# Patient Record
Sex: FEMALE | Race: WHITE | NOT HISPANIC OR LATINO | Employment: OTHER | URBAN - NONMETROPOLITAN AREA
[De-identification: names, ages, dates, MRNs, and addresses within clinical notes are randomized per-mention and may not be internally consistent; named-entity substitution may affect disease eponyms.]

---

## 2023-04-23 PROCEDURE — G0180 MD CERTIFICATION HHA PATIENT: HCPCS | Performed by: INTERNAL MEDICINE

## 2023-05-17 PROBLEM — I26.99 PULMONARY EMBOLISM (MULTI): Status: ACTIVE | Noted: 2023-05-17

## 2023-05-17 PROBLEM — E83.52 HYPERCALCEMIA: Status: ACTIVE | Noted: 2023-05-17

## 2023-05-17 PROBLEM — E66.9 OBESITY (BMI 30-39.9): Status: ACTIVE | Noted: 2023-05-17

## 2023-05-17 PROBLEM — I50.9 CHF (CONGESTIVE HEART FAILURE) (MULTI): Status: ACTIVE | Noted: 2023-05-17

## 2023-05-17 PROBLEM — E03.9 HYPOTHYROIDISM: Status: ACTIVE | Noted: 2023-05-17

## 2023-05-17 PROBLEM — E78.00 HYPERCHOLESTEROLEMIA: Status: ACTIVE | Noted: 2023-05-17

## 2023-05-17 PROBLEM — I10 HYPERTENSION: Status: ACTIVE | Noted: 2023-05-17

## 2023-05-17 PROBLEM — M81.0 OSTEOPOROSIS: Status: ACTIVE | Noted: 2023-05-17

## 2023-05-17 PROBLEM — M19.90 OSTEOARTHRITIS: Status: ACTIVE | Noted: 2023-05-17

## 2023-05-17 PROBLEM — G31.84 MCI (MILD COGNITIVE IMPAIRMENT): Status: ACTIVE | Noted: 2023-05-17

## 2023-05-17 PROBLEM — I87.2 VENOUS INSUFFICIENCY (CHRONIC) (PERIPHERAL): Status: ACTIVE | Noted: 2023-05-17

## 2023-05-17 PROBLEM — E78.5 DYSLIPIDEMIA: Status: ACTIVE | Noted: 2023-05-17

## 2023-05-17 PROBLEM — I48.91 ATRIAL FIBRILLATION (MULTI): Status: ACTIVE | Noted: 2023-05-17

## 2023-05-17 RX ORDER — LEVOTHYROXINE SODIUM 75 UG/1
1 TABLET ORAL DAILY
COMMUNITY
Start: 2012-11-07 | End: 2023-05-24 | Stop reason: SDUPTHER

## 2023-05-17 RX ORDER — DILTIAZEM HYDROCHLORIDE 180 MG/1
180 CAPSULE, COATED, EXTENDED RELEASE ORAL DAILY
COMMUNITY
Start: 2023-03-29 | End: 2023-07-03 | Stop reason: SDUPTHER

## 2023-05-17 RX ORDER — TRAMADOL HYDROCHLORIDE 50 MG/1
1 TABLET ORAL EVERY 6 HOURS PRN
COMMUNITY
Start: 2022-10-21 | End: 2023-11-15 | Stop reason: WASHOUT

## 2023-05-17 RX ORDER — WARFARIN SODIUM 5 MG/1
5 TABLET ORAL
COMMUNITY
End: 2023-11-07

## 2023-05-17 RX ORDER — OXYCODONE HYDROCHLORIDE 5 MG/1
5 TABLET ORAL EVERY 6 HOURS PRN
COMMUNITY
Start: 2023-04-21 | End: 2023-11-15 | Stop reason: WASHOUT

## 2023-05-17 RX ORDER — FUROSEMIDE 40 MG/1
1 TABLET ORAL DAILY
COMMUNITY
Start: 2018-11-19 | End: 2023-10-12 | Stop reason: SDUPTHER

## 2023-05-17 RX ORDER — ENOXAPARIN SODIUM 100 MG/ML
60 INJECTION SUBCUTANEOUS 2 TIMES DAILY
COMMUNITY
Start: 2023-03-30 | End: 2023-11-15 | Stop reason: WASHOUT

## 2023-05-17 RX ORDER — GABAPENTIN 100 MG/1
100 CAPSULE ORAL 3 TIMES DAILY
COMMUNITY
End: 2023-11-15 | Stop reason: WASHOUT

## 2023-05-17 RX ORDER — EPINEPHRINE 0.3 MG/.3ML
1 INJECTION SUBCUTANEOUS ONCE
COMMUNITY
Start: 2017-09-21

## 2023-05-17 RX ORDER — WARFARIN 1 MG/1
TABLET ORAL
COMMUNITY
Start: 2021-07-27 | End: 2024-05-29 | Stop reason: ALTCHOICE

## 2023-05-17 RX ORDER — METHOCARBAMOL 750 MG/1
750 TABLET, FILM COATED ORAL NIGHTLY
COMMUNITY
End: 2023-11-15 | Stop reason: WASHOUT

## 2023-05-24 ENCOUNTER — OFFICE VISIT (OUTPATIENT)
Dept: PRIMARY CARE | Facility: CLINIC | Age: 83
End: 2023-05-24
Payer: MEDICARE

## 2023-05-24 VITALS
SYSTOLIC BLOOD PRESSURE: 100 MMHG | BODY MASS INDEX: 29.43 KG/M2 | DIASTOLIC BLOOD PRESSURE: 56 MMHG | HEART RATE: 80 BPM | OXYGEN SATURATION: 96 % | WEIGHT: 172.4 LBS | TEMPERATURE: 96.1 F | HEIGHT: 64 IN

## 2023-05-24 DIAGNOSIS — Z12.31 ENCOUNTER FOR SCREENING MAMMOGRAM FOR BREAST CANCER: ICD-10-CM

## 2023-05-24 DIAGNOSIS — I10 HYPERTENSION, UNSPECIFIED TYPE: ICD-10-CM

## 2023-05-24 DIAGNOSIS — Z13.89 SCREENING FOR MULTIPLE CONDITIONS: ICD-10-CM

## 2023-05-24 DIAGNOSIS — E78.5 DYSLIPIDEMIA: ICD-10-CM

## 2023-05-24 DIAGNOSIS — I48.11 LONGSTANDING PERSISTENT ATRIAL FIBRILLATION (MULTI): ICD-10-CM

## 2023-05-24 DIAGNOSIS — Z96.642 HISTORY OF TOTAL LEFT HIP REPLACEMENT: ICD-10-CM

## 2023-05-24 DIAGNOSIS — R53.83 OTHER FATIGUE: ICD-10-CM

## 2023-05-24 DIAGNOSIS — E04.2 MULTINODULAR GOITER: ICD-10-CM

## 2023-05-24 DIAGNOSIS — I50.9 CONGESTIVE HEART FAILURE, UNSPECIFIED HF CHRONICITY, UNSPECIFIED HEART FAILURE TYPE (MULTI): ICD-10-CM

## 2023-05-24 DIAGNOSIS — E03.9 HYPOTHYROIDISM, UNSPECIFIED TYPE: Primary | ICD-10-CM

## 2023-05-24 DIAGNOSIS — Z79.899 HIGH RISK MEDICATION USE: ICD-10-CM

## 2023-05-24 DIAGNOSIS — E78.00 HYPERCHOLESTEREMIA: ICD-10-CM

## 2023-05-24 DIAGNOSIS — M50.90 CERVICAL DISC DISEASE: ICD-10-CM

## 2023-05-24 DIAGNOSIS — Z00.00 ROUTINE GENERAL MEDICAL EXAMINATION AT HEALTH CARE FACILITY: ICD-10-CM

## 2023-05-24 DIAGNOSIS — Z13.31 DEPRESSION SCREENING: ICD-10-CM

## 2023-05-24 PROCEDURE — G0439 PPPS, SUBSEQ VISIT: HCPCS | Performed by: INTERNAL MEDICINE

## 2023-05-24 PROCEDURE — 99214 OFFICE O/P EST MOD 30 MIN: CPT | Performed by: INTERNAL MEDICINE

## 2023-05-24 PROCEDURE — 1160F RVW MEDS BY RX/DR IN RCRD: CPT | Performed by: INTERNAL MEDICINE

## 2023-05-24 PROCEDURE — 1036F TOBACCO NON-USER: CPT | Performed by: INTERNAL MEDICINE

## 2023-05-24 PROCEDURE — 1159F MED LIST DOCD IN RCRD: CPT | Performed by: INTERNAL MEDICINE

## 2023-05-24 PROCEDURE — 1170F FXNL STATUS ASSESSED: CPT | Performed by: INTERNAL MEDICINE

## 2023-05-24 PROCEDURE — 3074F SYST BP LT 130 MM HG: CPT | Performed by: INTERNAL MEDICINE

## 2023-05-24 PROCEDURE — 3078F DIAST BP <80 MM HG: CPT | Performed by: INTERNAL MEDICINE

## 2023-05-24 PROCEDURE — G0444 DEPRESSION SCREEN ANNUAL: HCPCS | Performed by: INTERNAL MEDICINE

## 2023-05-24 RX ORDER — LEVOTHYROXINE SODIUM 75 UG/1
75 TABLET ORAL DAILY
Qty: 90 TABLET | Refills: 1 | Status: SHIPPED | OUTPATIENT
Start: 2023-05-24 | End: 2023-10-27 | Stop reason: SDUPTHER

## 2023-05-24 ASSESSMENT — ENCOUNTER SYMPTOMS
SORE THROAT: 0
SINUS PAIN: 0
OCCASIONAL FEELINGS OF UNSTEADINESS: 0
COUGH: 0
DEPRESSION: 0
HEADACHES: 0
ARTHRALGIAS: 0
HYPERTENSION: 1
PALPITATIONS: 0
DIZZINESS: 0
DIFFICULTY URINATING: 0
FEVER: 0
ABDOMINAL PAIN: 0
FATIGUE: 0
UNEXPECTED WEIGHT CHANGE: 0
BRUISES/BLEEDS EASILY: 0
BLOOD IN STOOL: 0
WHEEZING: 0
LOSS OF SENSATION IN FEET: 1
DIARRHEA: 0

## 2023-05-24 ASSESSMENT — PATIENT HEALTH QUESTIONNAIRE - PHQ9
2. FEELING DOWN, DEPRESSED OR HOPELESS: NOT AT ALL
SUM OF ALL RESPONSES TO PHQ9 QUESTIONS 1 AND 2: 0
1. LITTLE INTEREST OR PLEASURE IN DOING THINGS: NOT AT ALL

## 2023-05-24 ASSESSMENT — ACTIVITIES OF DAILY LIVING (ADL)
DRESSING: INDEPENDENT
MANAGING_FINANCES: NEEDS ASSISTANCE
GROCERY_SHOPPING: NEEDS ASSISTANCE
BATHING: INDEPENDENT
TAKING_MEDICATION: NEEDS ASSISTANCE
DOING_HOUSEWORK: NEEDS ASSISTANCE

## 2023-05-24 NOTE — PROGRESS NOTES
Subjective   Reason for Visit: Nadeen Moore is an 83 y.o. female here for a Medicare Wellness visit.     Past Medical, Surgical, and Family History reviewed and updated in chart.    Reviewed all medications by prescribing practitioner or clinical pharmacist (such as prescriptions, OTCs, herbal therapies and supplements) and documented in the medical record.    Annual preventive visit Medicare physical  -Vaccinations up-to-date  - Needs a screening mammogram order placed today  - Needs to complete blood work  - Screening for depression  I spent 15 minutes obtaining and discussing depression screening using PHQ-2 questions with results documented in the chart.  -    Follow-up  - Patient underwent uneventful surgery for left hip arthroplasty doing much better continue with physical therapy  -Cervical disc disease awaiting follow-up neurosurgery for possible surgical intervention due to bilateral upper extremity numbness continue monitoring mostly  -Multinodular goiter need to follow-up thyroid function test need to obtain thyroid ultrasound follow-up Dr. Hanna as needed  - Patient seen in the [facility] for memory evaluation patient preliminary testing mild dementia counseled about conservative measures for dementia no further needs at this time  -Chronic atrial fibrillation rate controlled  Coumadin therapy INR therapeutic follow-up Coumadin clinic  -Underlying history of DVT and PE atrial fibrillation continue warfarin  -pulmonary embolism compensated no recurrence  -CHF compensated improving patient now taking Lasix 20 milligrams daily blood pressure controlled  - status post right shoulder arthroplasty doing very well  -Atrial fibrillation rate controlled continue on ditiazem, rate controlled  Follow-up 6 months       Congestive Heart Failure  Pertinent negatives include no abdominal pain, chest pain, fatigue, palpitations or unexpected weight change.   Atrial Fibrillation  Symptoms  "are negative for chest pain, dizziness and palpitations. Past medical history includes atrial fibrillation, CHF and hyperlipidemia.   Hyperlipidemia  Pertinent negatives include no chest pain.   Hypertension  Pertinent negatives include no chest pain, headaches or palpitations.       Patient Care Team:  Tiny Norris MD as PCP - General  Tiny Norris MD as PCP - [facility] Attributed Provider     Review of Systems   Constitutional:  Negative for fatigue, fever and unexpected weight change.   HENT:  Negative for congestion, ear discharge, ear pain, mouth sores, sinus pain and sore throat.    Eyes:  Negative for visual disturbance.   Respiratory:  Negative for cough and wheezing.    Cardiovascular:  Negative for chest pain, palpitations and leg swelling.   Gastrointestinal:  Negative for abdominal pain, blood in stool and diarrhea.   Genitourinary:  Negative for difficulty urinating.   Musculoskeletal:  Negative for arthralgias.   Skin:  Negative for rash.   Neurological:  Negative for dizziness and headaches.   Hematological:  Does not bruise/bleed easily.   Psychiatric/Behavioral:  Negative for behavioral problems.    All other systems reviewed and are negative.      Objective   Vitals:  /56   Pulse 80   Temp 35.6 °C (96.1 °F)   Ht 1.613 m (5' 3.5\")   Wt 78.2 kg (172 lb 6.4 oz)   SpO2 96%   BMI 30.06 kg/m²       Physical Exam  Vitals and nursing note reviewed.   Constitutional:       Appearance: Normal appearance.   HENT:      Head: Normocephalic.      Nose: Nose normal.   Eyes:      Conjunctiva/sclera: Conjunctivae normal.      Pupils: Pupils are equal, round, and reactive to light.   Cardiovascular:      Rate and Rhythm: Regular rhythm.   Pulmonary:      Effort: Pulmonary effort is normal.      Breath sounds: Normal breath sounds.   Abdominal:      General: Abdomen is flat.      Palpations: Abdomen is soft.   Musculoskeletal:      Cervical back: Neck supple.   Skin:     General: Skin is warm. "   Neurological:      General: No focal deficit present.      Mental Status: She is oriented to person, place, and time.   Psychiatric:         Mood and Affect: Mood normal.         Assessment/Plan   Problem List Items Addressed This Visit          Circulatory    Atrial fibrillation (CMS/HCC)    Relevant Medications    dilTIAZem CD (Cardizem CD) 180 mg 24 hr capsule    EPINEPHrine 0.3 mg/0.3 mL injection syringe    CHF (congestive heart failure) (CMS/HCC)    Relevant Medications    dilTIAZem CD (Cardizem CD) 180 mg 24 hr capsule    EPINEPHrine 0.3 mg/0.3 mL injection syringe       Endocrine/Metabolic    Hypothyroidism       Other    Dyslipidemia     Annual preventive visit Medicare physical  -Vaccinations up-to-date  - Needs a screening mammogram order placed today  - Needs to complete blood work  - Screening for depression  I spent 15 minutes obtaining and discussing depression screening using PHQ-2 questions with results documented in the chart.  -    Follow-up  - Patient underwent uneventful surgery for left hip arthroplasty doing much better continue with physical therapy  -Cervical disc disease awaiting follow-up neurosurgery for possible surgical intervention due to bilateral upper extremity numbness continue monitoring mostly  -Multinodular goiter need to follow-up thyroid function test need to obtain thyroid ultrasound follow-up Dr. Hanna as needed  - Patient seen in the [facility] for memory evaluation patient preliminary testing mild dementia counseled about conservative measures for dementia no further needs at this time  -Chronic atrial fibrillation rate controlled  Coumadin therapy INR therapeutic follow-up Coumadin clinic  -Underlying history of DVT and PE atrial fibrillation continue warfarin  -pulmonary embolism compensated no recurrence  -CHF compensated improving patient now taking Lasix 20 milligrams daily blood pressure controlled  - status post right shoulder arthroplasty  doing very well  -Atrial fibrillation rate controlled continue on ditiazem, rate controlled  Follow-up 6 months

## 2023-05-25 ENCOUNTER — LAB (OUTPATIENT)
Dept: LAB | Facility: LAB | Age: 83
End: 2023-05-25
Payer: MEDICARE

## 2023-05-25 DIAGNOSIS — Z79.899 HIGH RISK MEDICATION USE: ICD-10-CM

## 2023-05-25 DIAGNOSIS — R53.83 OTHER FATIGUE: ICD-10-CM

## 2023-05-25 DIAGNOSIS — I10 HYPERTENSION, UNSPECIFIED TYPE: ICD-10-CM

## 2023-05-25 DIAGNOSIS — E78.00 HYPERCHOLESTEREMIA: ICD-10-CM

## 2023-05-25 LAB
ALANINE AMINOTRANSFERASE (SGPT) (U/L) IN SER/PLAS: 10 U/L (ref 7–45)
ALBUMIN (G/DL) IN SER/PLAS: 4.4 G/DL (ref 3.4–5)
ALKALINE PHOSPHATASE (U/L) IN SER/PLAS: 61 U/L (ref 33–136)
ANION GAP IN SER/PLAS: 12 MMOL/L (ref 10–20)
ASPARTATE AMINOTRANSFERASE (SGOT) (U/L) IN SER/PLAS: 13 U/L (ref 9–39)
BASOPHILS (10*3/UL) IN BLOOD BY AUTOMATED COUNT: 0.04 X10E9/L (ref 0–0.1)
BASOPHILS/100 LEUKOCYTES IN BLOOD BY AUTOMATED COUNT: 0.6 % (ref 0–2)
BILIRUBIN TOTAL (MG/DL) IN SER/PLAS: 0.5 MG/DL (ref 0–1.2)
CALCIUM (MG/DL) IN SER/PLAS: 10.3 MG/DL (ref 8.6–10.3)
CARBON DIOXIDE, TOTAL (MMOL/L) IN SER/PLAS: 28 MMOL/L (ref 21–32)
CHLORIDE (MMOL/L) IN SER/PLAS: 107 MMOL/L (ref 98–107)
CHOLESTEROL (MG/DL) IN SER/PLAS: 130 MG/DL (ref 0–199)
CHOLESTEROL IN HDL (MG/DL) IN SER/PLAS: 45.3 MG/DL
CHOLESTEROL/HDL RATIO: 2.9
CREATININE (MG/DL) IN SER/PLAS: 0.93 MG/DL (ref 0.5–1.05)
EOSINOPHILS (10*3/UL) IN BLOOD BY AUTOMATED COUNT: 0.29 X10E9/L (ref 0–0.4)
EOSINOPHILS/100 LEUKOCYTES IN BLOOD BY AUTOMATED COUNT: 4.4 % (ref 0–6)
ERYTHROCYTE DISTRIBUTION WIDTH (RATIO) BY AUTOMATED COUNT: 13.4 % (ref 11.5–14.5)
ERYTHROCYTE MEAN CORPUSCULAR HEMOGLOBIN CONCENTRATION (G/DL) BY AUTOMATED: 31.6 G/DL (ref 32–36)
ERYTHROCYTE MEAN CORPUSCULAR VOLUME (FL) BY AUTOMATED COUNT: 102 FL (ref 80–100)
ERYTHROCYTES (10*6/UL) IN BLOOD BY AUTOMATED COUNT: 4.2 X10E12/L (ref 4–5.2)
GFR FEMALE: 61 ML/MIN/1.73M2
GLUCOSE (MG/DL) IN SER/PLAS: 88 MG/DL (ref 74–99)
HEMATOCRIT (%) IN BLOOD BY AUTOMATED COUNT: 43 % (ref 36–46)
HEMOGLOBIN (G/DL) IN BLOOD: 13.6 G/DL (ref 12–16)
IMMATURE GRANULOCYTES/100 LEUKOCYTES IN BLOOD BY AUTOMATED COUNT: 0.3 % (ref 0–0.9)
LDL: 70 MG/DL (ref 0–99)
LEUKOCYTES (10*3/UL) IN BLOOD BY AUTOMATED COUNT: 6.6 X10E9/L (ref 4.4–11.3)
LYMPHOCYTES (10*3/UL) IN BLOOD BY AUTOMATED COUNT: 1.69 X10E9/L (ref 0.8–3)
LYMPHOCYTES/100 LEUKOCYTES IN BLOOD BY AUTOMATED COUNT: 25.7 % (ref 13–44)
MONOCYTES (10*3/UL) IN BLOOD BY AUTOMATED COUNT: 0.44 X10E9/L (ref 0.05–0.8)
MONOCYTES/100 LEUKOCYTES IN BLOOD BY AUTOMATED COUNT: 6.7 % (ref 2–10)
NEUTROPHILS (10*3/UL) IN BLOOD BY AUTOMATED COUNT: 4.09 X10E9/L (ref 1.6–5.5)
NEUTROPHILS/100 LEUKOCYTES IN BLOOD BY AUTOMATED COUNT: 62.3 % (ref 40–80)
PLATELETS (10*3/UL) IN BLOOD AUTOMATED COUNT: 180 X10E9/L (ref 150–450)
POTASSIUM (MMOL/L) IN SER/PLAS: 3.9 MMOL/L (ref 3.5–5.3)
PROTEIN TOTAL: 7 G/DL (ref 6.4–8.2)
SODIUM (MMOL/L) IN SER/PLAS: 143 MMOL/L (ref 136–145)
THYROTROPIN (MIU/L) IN SER/PLAS BY DETECTION LIMIT <= 0.05 MIU/L: 1.41 MIU/L (ref 0.44–3.98)
TRIGLYCERIDE (MG/DL) IN SER/PLAS: 76 MG/DL (ref 0–149)
UREA NITROGEN (MG/DL) IN SER/PLAS: 22 MG/DL (ref 6–23)
VLDL: 15 MG/DL (ref 0–40)

## 2023-05-25 PROCEDURE — 80061 LIPID PANEL: CPT

## 2023-05-25 PROCEDURE — 84443 ASSAY THYROID STIM HORMONE: CPT

## 2023-05-25 PROCEDURE — 36415 COLL VENOUS BLD VENIPUNCTURE: CPT

## 2023-05-25 PROCEDURE — 85025 COMPLETE CBC W/AUTO DIFF WBC: CPT

## 2023-05-25 PROCEDURE — 80053 COMPREHEN METABOLIC PANEL: CPT

## 2023-06-30 DIAGNOSIS — I10 HYPERTENSION, UNSPECIFIED TYPE: ICD-10-CM

## 2023-07-03 RX ORDER — DILTIAZEM HYDROCHLORIDE 180 MG/1
CAPSULE, COATED, EXTENDED RELEASE ORAL
Qty: 90 CAPSULE | Refills: 1 | Status: SHIPPED | OUTPATIENT
Start: 2023-07-03 | End: 2023-12-27 | Stop reason: SDUPTHER

## 2023-09-03 DIAGNOSIS — E78.5 DYSLIPIDEMIA: ICD-10-CM

## 2023-09-04 RX ORDER — ATORVASTATIN CALCIUM 20 MG/1
TABLET, FILM COATED ORAL
Qty: 90 TABLET | Refills: 1 | Status: SHIPPED | OUTPATIENT
Start: 2023-09-04 | End: 2024-01-18 | Stop reason: SDUPTHER

## 2023-10-03 ENCOUNTER — TELEPHONE (OUTPATIENT)
Dept: PHARMACY | Facility: HOSPITAL | Age: 83
End: 2023-10-03
Payer: MEDICARE

## 2023-10-03 NOTE — TELEPHONE ENCOUNTER
NICOLÁS ROSAS is a 82 year old female enrolled in the [facility] for warfarin management.     Patient is on warfarin for: atrial fibrillation   Patient's INR goal is: 2.0 - 3.0  Previous INR: 2.8  Patient's INR today is: 1.2    Warfarin tablet strength: 5 mg   Current warfarin regimen: 1 tablet daily   Weekly dose: 35 mg/week    Assessment and Plan: INR result received from [employer]. Reported INR is subtherapeutic at 1.2. Patient reports no changes in diet or medications. Usually eats 2-3 salads per week which has been consistent. Patient also recently underwent a hip replacement and states that she uses tylenol as needed for pain which is infrequent. Full adherence to warfarin regimen reported including no missed or extra doses. No signs or symptoms of bleeding or thrombosis reported. No identified cause of subtherapeutic level. Advised patient to take 10 mg x1 dose today, then resume warfarin 5 mg every day. Patient reports no refilled needed. Patient is agreeable to checking INR again in one week.

## 2023-10-10 ENCOUNTER — ANTICOAGULATION - WARFARIN VISIT (OUTPATIENT)
Dept: PHARMACY | Facility: HOSPITAL | Age: 83
End: 2023-10-10
Payer: MEDICARE

## 2023-10-10 DIAGNOSIS — I48.91 ATRIAL FIBRILLATION, UNSPECIFIED TYPE (MULTI): Primary | ICD-10-CM

## 2023-10-10 LAB
POC INR: 1.6 (ref 2–3)
POC PROTHROMBIN TIME: ABNORMAL

## 2023-10-10 PROCEDURE — 85610 PROTHROMBIN TIME: CPT | Performed by: PHARMACY

## 2023-10-10 NOTE — PROGRESS NOTES
Subjective     Nadeen Moore is a 83 y.o. female who presents for anticoagulation follow up for atrial fibrillation.    Location: [facility]     Referring Physician: Dr. Tiny Norris  INR Goal: 2.0-3.0  Anticoagulation Medication: warfarin  Indication: atrial fibrillation    Bleeding signs/symptoms: No    Bruising: No   Major bleeding event: No  Thrombosis signs/symptoms: No  Thromboembolic event: No  Missed doses: No  Extra doses: No  Medication changes: No  Dietary changes: Yes  less salads; only a couple times per week  Change in health: No  Change in activity: No  Alcohol: No  Other concerns: No    Upcoming Surgeries:  Does the Patient Have any upcoming surgeries that require interruption in anticoagulation therapy? no  Does the patient require bridging? no    Current Outpatient Medications on File Prior to Visit   Medication Sig Dispense Refill    atorvastatin (Lipitor) 20 mg tablet TAKE 1 TABLET DAILY 90 tablet 1    dilTIAZem CD (Cardizem CD) 180 mg 24 hr capsule TAKE 1 CAPSULE DAILY 90 capsule 1    enoxaparin (Lovenox) 60 mg/0.6 mL syringe Inject 0.6 mL (60 mg) under the skin 2 times a day.      EPINEPHrine 0.3 mg/0.3 mL injection syringe Inject 0.3 mL (0.3 mg) as directed 1 time.      FIBER, CALCIUM POLYCARBOPHIL, ORAL Take by mouth.      furosemide (Lasix) 40 mg tablet Take 1 tablet (40 mg) by mouth once daily.      gabapentin (Neurontin) 100 mg capsule Take 1 capsule (100 mg) by mouth 3 times a day.      levothyroxine (Synthroid, Levoxyl) 75 mcg tablet Take 1 tablet (75 mcg) by mouth once daily. 90 tablet 1    methocarbamol (Robaxin) 750 mg tablet Take 1 tablet (750 mg) by mouth once daily at bedtime.      oxyCODONE (Roxicodone) 5 mg immediate release tablet Take 1 tablet (5 mg) by mouth every 6 hours if needed.      traMADol (Ultram) 50 mg tablet Take 1 tablet (50 mg) by mouth every 6 hours if needed for pain.      warfarin (Coumadin) 1 mg tablet Take by mouth.       warfarin (Coumadin) 5 mg tablet Take 1 tablet (5 mg) by mouth. as directed       No current facility-administered medications on file prior to visit.        Objective   INR Today: 1.6  Previous INR: 1.2    Anticoagulation Summary  As of 10/10/2023      INR goal:  2.0-3.0   TTR:  --   INR used for dosin.60 (10/10/2023)   Weekly warfarin total:  40 mg             Lab Results   Component Value Date    INR 1.60 (A) 10/10/2023    INR 1.1 2021    INR 0.9 2020    INR 1.9 (H) 2020    PROTIME 12.6 2021    PROTIME 10.3 2020    PROTIME 21.4 (H) 2020       Assessment/Plan   Current INR is Subtherapeutic. Previous INR one week ago was also subtherapeutic at 1.2. No specific cause for subtherapeutic levels identified. Patient typically tests at home but ran out of test strips and wanted to come in to the office for POCT. Ordered more test strips and lancets in office with daughter, Carol. Patient reports that her memory is going. She uses a pillbox for all medications. AVS provided with dosing instructions.     Plan:  Increase weekly warfarin dose to 5 mg every day except 7.5 mg on  and .   Approximately 15% increase in weekly dose.  Follow Up: 1 week (resume home INR testing)    Danielle Parker, MendezD, BCPS

## 2023-10-12 DIAGNOSIS — I50.9 CONGESTIVE HEART FAILURE, UNSPECIFIED HF CHRONICITY, UNSPECIFIED HEART FAILURE TYPE (MULTI): ICD-10-CM

## 2023-10-12 RX ORDER — FUROSEMIDE 40 MG/1
40 TABLET ORAL DAILY
Qty: 90 TABLET | Refills: 0 | Status: SHIPPED | OUTPATIENT
Start: 2023-10-12 | End: 2024-01-08

## 2023-10-17 ENCOUNTER — ANTICOAGULATION - WARFARIN VISIT (OUTPATIENT)
Dept: PHARMACY | Facility: HOSPITAL | Age: 83
End: 2023-10-17
Payer: MEDICARE

## 2023-10-17 DIAGNOSIS — I48.91 ATRIAL FIBRILLATION, UNSPECIFIED TYPE (MULTI): Primary | ICD-10-CM

## 2023-10-17 LAB
POC INR: 2.3 (ref 2–3)
POC PROTHROMBIN TIME: ABNORMAL

## 2023-10-17 PROCEDURE — 85610 PROTHROMBIN TIME: CPT | Performed by: PHARMACY

## 2023-10-17 NOTE — PROGRESS NOTES
Subjective     Nadeen Moore is a 83 y.o. female who presents for anticoagulation follow up.     Location: [facility]     Referring Physician: Dr. Tiny Norris  INR Goal: 2.0-3.0  Anticoagulation Medication: warfarin  Indication: atrial fibrillation    Bleeding signs/symptoms: No  Bruising: No   Major bleeding event: No  Thrombosis signs/symptoms: No  Thromboembolic event: No  Missed doses: No  Extra doses: No  Medication changes: No  Dietary changes: No  Change in health: No  Change in activity: No  Alcohol: No  Other concerns: No    Upcoming Surgeries:  Does the Patient Have any upcoming surgeries that require interruption in anticoagulation therapy? no  Does the patient require bridging? no    Current Outpatient Medications on File Prior to Visit   Medication Sig Dispense Refill    atorvastatin (Lipitor) 20 mg tablet TAKE 1 TABLET DAILY 90 tablet 1    dilTIAZem CD (Cardizem CD) 180 mg 24 hr capsule TAKE 1 CAPSULE DAILY 90 capsule 1    enoxaparin (Lovenox) 60 mg/0.6 mL syringe Inject 0.6 mL (60 mg) under the skin 2 times a day.      EPINEPHrine 0.3 mg/0.3 mL injection syringe Inject 0.3 mL (0.3 mg) as directed 1 time.      FIBER, CALCIUM POLYCARBOPHIL, ORAL Take by mouth.      furosemide (Lasix) 40 mg tablet Take 1 tablet (40 mg) by mouth once daily. 90 tablet 0    gabapentin (Neurontin) 100 mg capsule Take 1 capsule (100 mg) by mouth 3 times a day.      levothyroxine (Synthroid, Levoxyl) 75 mcg tablet Take 1 tablet (75 mcg) by mouth once daily. 90 tablet 1    methocarbamol (Robaxin) 750 mg tablet Take 1 tablet (750 mg) by mouth once daily at bedtime.      oxyCODONE (Roxicodone) 5 mg immediate release tablet Take 1 tablet (5 mg) by mouth every 6 hours if needed.      traMADol (Ultram) 50 mg tablet Take 1 tablet (50 mg) by mouth every 6 hours if needed for pain.      warfarin (Coumadin) 1 mg tablet Take by mouth.      warfarin (Coumadin) 5 mg tablet Take 1 tablet (5 mg)  by mouth. as directed      [DISCONTINUED] furosemide (Lasix) 40 mg tablet Take 1 tablet (40 mg) by mouth once daily.       No current facility-administered medications on file prior to visit.        Objective   INR Today: 2.3  Previous INR: 1.6    Anticoagulation Summary  As of 10/17/2023      INR goal:  2.0-3.0   TTR:  --   INR used for dosin.30 (10/17/2023)   Weekly warfarin total:  40 mg             Lab Results   Component Value Date    INR 2.30 (N) 10/17/2023    INR 1.60 (A) 10/10/2023    INR 1.1 2021    INR 0.9 2020    INR 1.9 (H) 2020    PROTIME 12.6 2021    PROTIME 10.3 2020    PROTIME 21.4 (H) 2020       Assessment/Plan   Current INR is Therapeutic. Patient reported errors with her home [employer] meter. Writer tested INR with office meter which resulted at 2.3. INR was repeated using patient home meter to identify issues. INR was successfully obtained and resulted at 2.4. No other changes reported from previous visit.     Plan:  Continue current warfarin regimen of 5 mg every day except 7.5 mg on Tues/Thurs     Follow Up: 1 week    Danielle Parker, MendezD, BCPS

## 2023-10-24 ENCOUNTER — ANTICOAGULATION - WARFARIN VISIT (OUTPATIENT)
Dept: PHARMACY | Facility: HOSPITAL | Age: 83
End: 2023-10-24
Payer: MEDICARE

## 2023-10-24 ENCOUNTER — APPOINTMENT (OUTPATIENT)
Dept: PHARMACY | Facility: HOSPITAL | Age: 83
End: 2023-10-24
Payer: MEDICARE

## 2023-10-24 DIAGNOSIS — I48.91 ATRIAL FIBRILLATION, UNSPECIFIED TYPE (MULTI): Primary | ICD-10-CM

## 2023-10-24 LAB
INR IN PPP BY COAGULATION ASSAY EXTERNAL: 1.9 (ref 2–3)
PROTHROMBIN TIME (PT) IN PPP BY COAGULATION ASSAY EXTERNAL: ABNORMAL SECONDS

## 2023-10-24 NOTE — PROGRESS NOTES
Subjective     Nadeen Moore is a 83 y.o. female who presents for anticoagulation follow up.     Location: [facility]     Referring Physician: Dr. Tiny Norris  INR Goal: 2.0-3.0  Anticoagulation Medication: warfarin  Indication: Atrial Fibrillation/Atrial Flutter    Bleeding signs/symptoms: No  Bruising: No   Major bleeding event: No  Thrombosis signs/symptoms: No  Thromboembolic event: No  Missed doses: No  Extra doses: No  Medication changes: No  Dietary changes: No  Change in health: No  Change in activity: No  Alcohol: No  Other concerns: No    Upcoming Surgeries:  Does the Patient Have any upcoming surgeries that require interruption in anticoagulation therapy? no  Does the patient require bridging? no    Current Outpatient Medications on File Prior to Visit   Medication Sig Dispense Refill    warfarin (Coumadin) 1 mg tablet Take by mouth.      warfarin (Coumadin) 5 mg tablet Take 1 tablet (5 mg) by mouth. as directed      atorvastatin (Lipitor) 20 mg tablet TAKE 1 TABLET DAILY 90 tablet 1    dilTIAZem CD (Cardizem CD) 180 mg 24 hr capsule TAKE 1 CAPSULE DAILY 90 capsule 1    enoxaparin (Lovenox) 60 mg/0.6 mL syringe Inject 0.6 mL (60 mg) under the skin 2 times a day.      EPINEPHrine 0.3 mg/0.3 mL injection syringe Inject 0.3 mL (0.3 mg) as directed 1 time.      FIBER, CALCIUM POLYCARBOPHIL, ORAL Take by mouth.      furosemide (Lasix) 40 mg tablet Take 1 tablet (40 mg) by mouth once daily. 90 tablet 0    gabapentin (Neurontin) 100 mg capsule Take 1 capsule (100 mg) by mouth 3 times a day.      levothyroxine (Synthroid, Levoxyl) 75 mcg tablet Take 1 tablet (75 mcg) by mouth once daily. 90 tablet 1    methocarbamol (Robaxin) 750 mg tablet Take 1 tablet (750 mg) by mouth once daily at bedtime.      oxyCODONE (Roxicodone) 5 mg immediate release tablet Take 1 tablet (5 mg) by mouth every 6 hours if needed.      traMADol (Ultram) 50 mg tablet Take 1 tablet (50 mg) by  mouth every 6 hours if needed for pain.       No current facility-administered medications on file prior to visit.        Objective   INR Today: 1.9  Previous INR: 2.3    Anticoagulation Summary  As of 10/24/2023      INR goal:  2.0-3.0   TTR:  61.1 % (4 d)   INR used for dosin.90 (10/24/2023)   Weekly warfarin total:  40 mg             Lab Results   Component Value Date    INR 1.90 (A) 10/24/2023    INR 2.30 (N) 10/17/2023    INR 1.60 (A) 10/10/2023    INR 1.1 2021    INR 0.9 2020    INR 1.9 (H) 2020    PROTIME 12.6 2021    PROTIME 10.3 2020    PROTIME 21.4 (H) 2020       Assessment/Plan   Current INR is slightly Subtherapeutic at 1.9. Patient and daughter, Carol, presented to pharmacy clinic to review steps to obtain INR sample. Patient meter and strips were utilized to obtain sample. Carol reports that she feels comfortable obtaining next INR at home in one week. Patient reports no changes from previous visit when INR was in range. Reports full adherence with regimen. No bleeding or bruising. If INR remains low at next check, plan to increase weekly dose at that time.      Plan:  Continue current warfarin regimen of 5 mg every day except 7.5 mg on Tues/Thurs     Follow Up: 1 week with home INR meter    Danielle Parker, MendezD, BCPS

## 2023-10-27 DIAGNOSIS — E03.9 HYPOTHYROIDISM, UNSPECIFIED TYPE: ICD-10-CM

## 2023-10-27 RX ORDER — LEVOTHYROXINE SODIUM 75 UG/1
75 TABLET ORAL DAILY
Qty: 90 TABLET | Refills: 1 | Status: SHIPPED | OUTPATIENT
Start: 2023-10-27 | End: 2024-01-18 | Stop reason: SDUPTHER

## 2023-10-31 ENCOUNTER — ANTICOAGULATION - WARFARIN VISIT (OUTPATIENT)
Dept: PHARMACY | Facility: HOSPITAL | Age: 83
End: 2023-10-31
Payer: MEDICARE

## 2023-10-31 LAB
INR IN PPP BY COAGULATION ASSAY EXTERNAL: 1.8 (ref 2–3)
PROTHROMBIN TIME (PT) IN PPP BY COAGULATION ASSAY EXTERNAL: ABNORMAL SECONDS

## 2023-10-31 NOTE — PROGRESS NOTES
Subjective     Nadeen Moore is a 83 y.o. female who presents for anticoagulation follow up.     Location: [facility]     Referring Physician: Dr. Tiny Norris  INR Goal: 2.0-3.0  Anticoagulation Medication: warfarin  Indication: Atrial Fibrillation/Atrial Flutter    Bleeding signs/symptoms: No  Bruising: No   Major bleeding event: No  Thrombosis signs/symptoms: No  Thromboembolic event: No  Missed doses: Yes  Saturday (5 mg)  Extra doses: No  Medication changes: No  Dietary changes: No  Change in health: No  Change in activity: No  Alcohol: No  Other concerns: No    Upcoming Surgeries:  Does the Patient Have any upcoming surgeries that require interruption in anticoagulation therapy? no  Does the patient require bridging? no    Current Outpatient Medications on File Prior to Visit   Medication Sig Dispense Refill    warfarin (Coumadin) 1 mg tablet Take by mouth.      warfarin (Coumadin) 5 mg tablet Take 1 tablet (5 mg) by mouth. as directed      atorvastatin (Lipitor) 20 mg tablet TAKE 1 TABLET DAILY 90 tablet 1    dilTIAZem CD (Cardizem CD) 180 mg 24 hr capsule TAKE 1 CAPSULE DAILY 90 capsule 1    enoxaparin (Lovenox) 60 mg/0.6 mL syringe Inject 0.6 mL (60 mg) under the skin 2 times a day.      EPINEPHrine 0.3 mg/0.3 mL injection syringe Inject 0.3 mL (0.3 mg) as directed 1 time.      FIBER, CALCIUM POLYCARBOPHIL, ORAL Take by mouth.      furosemide (Lasix) 40 mg tablet Take 1 tablet (40 mg) by mouth once daily. 90 tablet 0    gabapentin (Neurontin) 100 mg capsule Take 1 capsule (100 mg) by mouth 3 times a day.      levothyroxine (Synthroid, Levoxyl) 75 mcg tablet TAKE 1 TABLET ONCE DAILY 90 tablet 1    methocarbamol (Robaxin) 750 mg tablet Take 1 tablet (750 mg) by mouth once daily at bedtime.      oxyCODONE (Roxicodone) 5 mg immediate release tablet Take 1 tablet (5 mg) by mouth every 6 hours if needed.      traMADol (Ultram) 50 mg tablet Take 1 tablet (50 mg) by  mouth every 6 hours if needed for pain.      [DISCONTINUED] levothyroxine (Synthroid, Levoxyl) 75 mcg tablet Take 1 tablet (75 mcg) by mouth once daily. 90 tablet 1     No current facility-administered medications on file prior to visit.        Objective   INR Today: 1.8  Previous INR: 1.9    Anticoagulation Summary  As of 10/31/2023      INR goal:  2.0-3.0   TTR:  25.0 % (1.6 wk)   INR used for dosin.80 (10/31/2023)   Weekly warfarin total:  40 mg             Assessment/Plan   Current INR is Subtherapeutic at 1.8. Result called in by patient's daughter Carol. Subtherapeutic level today likely due to missed dose on Saturday (5 mg). No other changes reported.     Plan: 10 mg x1 dose today, then resume previous regimen of 7.5 mg on Tue/Thur and 5 mg all other days    Follow Up: INR check at home in one week.     Danielle Parker, PharmD, BCPS

## 2023-11-07 DIAGNOSIS — I48.11 LONGSTANDING PERSISTENT ATRIAL FIBRILLATION (MULTI): Primary | ICD-10-CM

## 2023-11-07 RX ORDER — WARFARIN SODIUM 5 MG/1
TABLET ORAL
Qty: 96 TABLET | Refills: 1 | Status: SHIPPED | OUTPATIENT
Start: 2023-11-07 | End: 2024-05-29 | Stop reason: ALTCHOICE

## 2023-11-09 ENCOUNTER — ANTICOAGULATION - WARFARIN VISIT (OUTPATIENT)
Dept: PHARMACY | Facility: HOSPITAL | Age: 83
End: 2023-11-09
Payer: MEDICARE

## 2023-11-09 NOTE — PROGRESS NOTES
Subjective     Nadeen Moore is a 83 y.o. female who presents for anticoagulation follow up.     Location: [facility]     Referring Physician: Dr Tiny Norris  INR Goal: 2.0-3.0  Anticoagulation Medication: warfarin  Indication: Atrial Fibrillation/Atrial Flutter    Bleeding signs/symptoms: No  Bruising: No   Major bleeding event: No  Thrombosis signs/symptoms: No  Thromboembolic event: No  Missed doses: No  Extra doses: No  Medication changes: No  Dietary changes: No  Change in health: No  Change in activity: No  Alcohol: No  Other concerns: No      Current Outpatient Medications on File Prior to Visit   Medication Sig Dispense Refill    warfarin (Coumadin) 5 mg tablet Take 1.5 tablets (7.5 mg) on Tuesday and Thursday and 1 tablet (5 mg) all other days 96 tablet 1    atorvastatin (Lipitor) 20 mg tablet TAKE 1 TABLET DAILY 90 tablet 1    dilTIAZem CD (Cardizem CD) 180 mg 24 hr capsule TAKE 1 CAPSULE DAILY 90 capsule 1    enoxaparin (Lovenox) 60 mg/0.6 mL syringe Inject 0.6 mL (60 mg) under the skin 2 times a day.      EPINEPHrine 0.3 mg/0.3 mL injection syringe Inject 0.3 mL (0.3 mg) as directed 1 time.      FIBER, CALCIUM POLYCARBOPHIL, ORAL Take by mouth.      furosemide (Lasix) 40 mg tablet Take 1 tablet (40 mg) by mouth once daily. 90 tablet 0    gabapentin (Neurontin) 100 mg capsule Take 1 capsule (100 mg) by mouth 3 times a day.      levothyroxine (Synthroid, Levoxyl) 75 mcg tablet TAKE 1 TABLET ONCE DAILY 90 tablet 1    methocarbamol (Robaxin) 750 mg tablet Take 1 tablet (750 mg) by mouth once daily at bedtime.      oxyCODONE (Roxicodone) 5 mg immediate release tablet Take 1 tablet (5 mg) by mouth every 6 hours if needed.      traMADol (Ultram) 50 mg tablet Take 1 tablet (50 mg) by mouth every 6 hours if needed for pain.      warfarin (Coumadin) 1 mg tablet Take by mouth.      [DISCONTINUED] warfarin (Coumadin) 5 mg tablet Take 1 tablet (5 mg) by mouth. as directed        No current facility-administered medications on file prior to visit.        Objective   INR Today: 1.8  Previous INR: 1.8      Anticoagulation Summary  As of 2023      INR goal:  2.0-3.0   TTR:  13.7 % (2.9 wk)   INR used for dosin.80 (2023)   Weekly warfarin total:  42.5 mg             Lab Results   Component Value Date    INR 1.80 (A) 2023    INR 1.80 (A) 10/31/2023    INR 1.90 (A) 10/24/2023    INR 2.30 (N) 10/17/2023    INR 1.60 (A) 10/10/2023    PROTIME 12.6 2021    PROTIME 10.3 2020    PROTIME 21.4 (H) 2020       Assessment/Plan   Current INR is Subtherapeutic at 1.8. Previous INR was also Subtherapeutic at 1.8. Spoke with patient's daughter Carol who reports no missed doses since last week and no other medication or dietary changes. Reports only having 3 test strips left and may need to make in person appointment towards end of month before eligible for refill. Advised to take 10 mg x1 today, then change home warfarin regimen to 7.5 mg Tues/Thurs/Sat, and 5 mg all other days.    Follow Up: 1 week    Danielle Parker, MendezD, BCPS

## 2023-11-15 ENCOUNTER — OFFICE VISIT (OUTPATIENT)
Dept: PRIMARY CARE | Facility: CLINIC | Age: 83
End: 2023-11-15
Payer: MEDICARE

## 2023-11-15 VITALS
SYSTOLIC BLOOD PRESSURE: 120 MMHG | HEART RATE: 83 BPM | TEMPERATURE: 97.4 F | BODY MASS INDEX: 31.56 KG/M2 | OXYGEN SATURATION: 96 % | WEIGHT: 181 LBS | DIASTOLIC BLOOD PRESSURE: 62 MMHG

## 2023-11-15 DIAGNOSIS — E03.9 HYPOTHYROIDISM, UNSPECIFIED TYPE: Primary | ICD-10-CM

## 2023-11-15 DIAGNOSIS — Z12.31 ENCOUNTER FOR SCREENING MAMMOGRAM FOR MALIGNANT NEOPLASM OF BREAST: ICD-10-CM

## 2023-11-15 DIAGNOSIS — Z79.899 HIGH RISK MEDICATION USE: ICD-10-CM

## 2023-11-15 DIAGNOSIS — R53.83 OTHER FATIGUE: ICD-10-CM

## 2023-11-15 DIAGNOSIS — E78.5 DYSLIPIDEMIA: ICD-10-CM

## 2023-11-15 DIAGNOSIS — E55.9 VITAMIN D DEFICIENCY, UNSPECIFIED: ICD-10-CM

## 2023-11-15 DIAGNOSIS — F02.A0 MILD LATE ONSET ALZHEIMER'S DEMENTIA, UNSPECIFIED WHETHER BEHAVIORAL, PSYCHOTIC, OR MOOD DISTURBANCE OR ANXIETY (MULTI): ICD-10-CM

## 2023-11-15 DIAGNOSIS — G30.1 MILD LATE ONSET ALZHEIMER'S DEMENTIA, UNSPECIFIED WHETHER BEHAVIORAL, PSYCHOTIC, OR MOOD DISTURBANCE OR ANXIETY (MULTI): ICD-10-CM

## 2023-11-15 DIAGNOSIS — E53.8 VITAMIN B12 DEFICIENCY: ICD-10-CM

## 2023-11-15 DIAGNOSIS — E78.00 HYPERCHOLESTEREMIA: ICD-10-CM

## 2023-11-15 DIAGNOSIS — I10 HYPERTENSION, UNSPECIFIED TYPE: ICD-10-CM

## 2023-11-15 PROCEDURE — 1159F MED LIST DOCD IN RCRD: CPT | Performed by: INTERNAL MEDICINE

## 2023-11-15 PROCEDURE — 1036F TOBACCO NON-USER: CPT | Performed by: INTERNAL MEDICINE

## 2023-11-15 PROCEDURE — 1160F RVW MEDS BY RX/DR IN RCRD: CPT | Performed by: INTERNAL MEDICINE

## 2023-11-15 PROCEDURE — 3074F SYST BP LT 130 MM HG: CPT | Performed by: INTERNAL MEDICINE

## 2023-11-15 PROCEDURE — 1125F AMNT PAIN NOTED PAIN PRSNT: CPT | Performed by: INTERNAL MEDICINE

## 2023-11-15 PROCEDURE — 99214 OFFICE O/P EST MOD 30 MIN: CPT | Performed by: INTERNAL MEDICINE

## 2023-11-15 PROCEDURE — 3078F DIAST BP <80 MM HG: CPT | Performed by: INTERNAL MEDICINE

## 2023-11-15 RX ORDER — BISMUTH SUBSALICYLATE 262 MG
1 TABLET,CHEWABLE ORAL DAILY
COMMUNITY

## 2023-11-15 RX ORDER — DONEPEZIL HYDROCHLORIDE 5 MG/1
5 TABLET, FILM COATED ORAL
COMMUNITY
Start: 2023-10-30 | End: 2024-05-29 | Stop reason: ALTCHOICE

## 2023-11-15 RX ORDER — ACETAMINOPHEN 500 MG
500 TABLET ORAL EVERY 6 HOURS PRN
COMMUNITY

## 2023-11-15 ASSESSMENT — ENCOUNTER SYMPTOMS
DIFFICULTY URINATING: 0
COUGH: 0
UNEXPECTED WEIGHT CHANGE: 0
HEADACHES: 0
FATIGUE: 0
DIARRHEA: 0
PALPITATIONS: 0
ARTHRALGIAS: 0
SINUS PAIN: 0
ABDOMINAL PAIN: 0
DIZZINESS: 0
SORE THROAT: 0
FEVER: 0
BRUISES/BLEEDS EASILY: 0
WHEEZING: 0
BLOOD IN STOOL: 0

## 2023-11-15 NOTE — PROGRESS NOTES
Subjective   Patient ID: Nadeen Moore is a 83 y.o. female who presents for Hyperlipidemia, Hypothyroidism, and Med Refill.    -Multinodular goiter ultrasound showed no change normal thyroid function test  Follow-up Dr. Hanna as needed  - Left hip arthroplasty doing well  - Patient seen in the [facility] for memory evaluation patient preliminary testing mild dementia counseled about conservative measures for dementia  Now on Aricept 5 mg no significant change awaiting follow-up  -Chronic atrial fibrillation rate controlled  Coumadin therapy INR therapeutic follow-up Coumadin clinic  -Underlying history of DVT and PE atrial fibrillation continue warfarin  -pulmonary embolism compensated no recurrence  -CHF compensated improving patient now taking Lasix 20 milligrams daily blood pressure controlled  - status post right shoulder arthroplasty doing very well  - Needs mammogram complete blood work Medicare physical in 6 months    Hyperlipidemia  Pertinent negatives include no chest pain.   Med Refill  Pertinent negatives include no abdominal pain, arthralgias, chest pain, congestion, coughing, fatigue, fever, headaches, rash or sore throat.          Review of Systems   Constitutional:  Negative for fatigue, fever and unexpected weight change.   HENT:  Negative for congestion, ear discharge, ear pain, mouth sores, sinus pain and sore throat.    Eyes:  Negative for visual disturbance.   Respiratory:  Negative for cough and wheezing.    Cardiovascular:  Negative for chest pain, palpitations and leg swelling.   Gastrointestinal:  Negative for abdominal pain, blood in stool and diarrhea.   Genitourinary:  Negative for difficulty urinating.   Musculoskeletal:  Negative for arthralgias.   Skin:  Negative for rash.   Neurological:  Negative for dizziness and headaches.   Hematological:  Does not bruise/bleed easily.   Psychiatric/Behavioral:  Negative for behavioral problems.    All other systems  "reviewed and are negative.      Objective   No results found for: \"HGBA1C\"   /62   Pulse 83   Temp 36.3 °C (97.4 °F)   Wt 82.1 kg (181 lb)   SpO2 96%   BMI 31.56 kg/m²   Lab Results   Component Value Date    WBC 6.6 05/25/2023    HGB 13.6 05/25/2023    HCT 43.0 05/25/2023     05/25/2023    CHOL 130 05/25/2023    TRIG 76 05/25/2023    HDL 45.3 05/25/2023    ALT 10 05/25/2023    AST 13 05/25/2023     05/25/2023    K 3.9 05/25/2023     05/25/2023    CREATININE 0.93 05/25/2023    BUN 22 05/25/2023    CO2 28 05/25/2023    TSH 1.41 05/25/2023    INR 1.80 (A) 11/09/2023     par   Physical Exam  Vitals and nursing note reviewed.   Constitutional:       Appearance: Normal appearance.   HENT:      Head: Normocephalic.      Nose: Nose normal.   Eyes:      Conjunctiva/sclera: Conjunctivae normal.      Pupils: Pupils are equal, round, and reactive to light.   Cardiovascular:      Rate and Rhythm: Regular rhythm.   Pulmonary:      Effort: Pulmonary effort is normal.      Breath sounds: Normal breath sounds.   Abdominal:      General: Abdomen is flat.      Palpations: Abdomen is soft.   Musculoskeletal:      Cervical back: Neck supple.   Skin:     General: Skin is warm.   Neurological:      General: No focal deficit present.      Mental Status: She is oriented to person, place, and time.   Psychiatric:         Mood and Affect: Mood normal.         Assessment/Plan   Nadeen was seen today for hyperlipidemia, hypothyroidism and med refill.  Diagnoses and all orders for this visit:  Hypothyroidism, unspecified type (Primary)  High risk medication use  -     CBC and Auto Differential; Future  Hypertension, unspecified type  -     Comprehensive Metabolic Panel; Future  Encounter for screening mammogram for malignant neoplasm of breast  -     BI mammo bilateral screening tomosynthesis; Future  Hypercholesteremia  -     Lipid Panel; Future  Other fatigue  -     TSH with reflex to Free T4 if abnormal; " Future  Vitamin B12 deficiency  -     Vitamin B12; Future  Vitamin D deficiency, unspecified  -     Vitamin D 25-Hydroxy,Total (for eval of Vitamin D levels); Future  Dyslipidemia  Mild late onset Alzheimer's dementia, unspecified whether behavioral, psychotic, or mood disturbance or anxiety (CMS/HCC)  Other orders  -     Follow Up In Primary Care  -     Follow Up In Primary Care - Medicare Annual; Future   Multinodular goiter ultrasound showed no change normal thyroid function test  Follow-up Dr. Hanna as needed  - Left hip arthroplasty doing well  - Patient seen in the [facility] for memory evaluation patient preliminary testing mild dementia counseled about conservative measures for dementia  Now on Aricept 5 mg no significant change awaiting follow-up  -Chronic atrial fibrillation rate controlled  Coumadin therapy INR therapeutic follow-up Coumadin clinic  -Underlying history of DVT and PE atrial fibrillation continue warfarin  -pulmonary embolism compensated no recurrence  -CHF compensated improving patient now taking Lasix 20 milligrams daily blood pressure controlled  - status post right shoulder arthroplasty doing very well  - Needs mammogram complete blood work Medicare physical in 6 months

## 2023-11-16 ENCOUNTER — ANTICOAGULATION - WARFARIN VISIT (OUTPATIENT)
Dept: PHARMACY | Facility: HOSPITAL | Age: 83
End: 2023-11-16
Payer: MEDICARE

## 2023-11-16 DIAGNOSIS — I48.91 ATRIAL FIBRILLATION, UNSPECIFIED TYPE (MULTI): Primary | ICD-10-CM

## 2023-11-16 LAB
INR IN PPP BY COAGULATION ASSAY EXTERNAL: 1.9 (ref 2–3)
POC INR: 1.9 (ref 2–3)
POC PROTHROMBIN TIME: ABNORMAL
PROTHROMBIN TIME (PT) IN PPP BY COAGULATION ASSAY EXTERNAL: ABNORMAL SECONDS

## 2023-11-16 PROCEDURE — 85610 PROTHROMBIN TIME: CPT | Performed by: PHARMACY

## 2023-11-16 NOTE — PROGRESS NOTES
Subjective     Nadeen Moore is a 83 y.o. female who presents for anticoagulation follow up.     Location: [facility]     Referring Physician: Dr Tiny Norris  INR Goal: 2.0-3.0  Anticoagulation Medication: warfarin  Indication: Atrial Fibrillation/Atrial Flutter    Bleeding signs/symptoms: No  Bruising: No   Major bleeding event: No  Thrombosis signs/symptoms: No  Thromboembolic event: No  Missed doses: Yes  Missed 7.5 mg on Tuesday  Extra doses: Yes  Boosted dose following missed dose  Medication changes: No  Dietary changes: No  Change in health: No  Change in activity: No  Alcohol: No  Other concerns: No      Current Outpatient Medications on File Prior to Visit   Medication Sig Dispense Refill    warfarin (Coumadin) 5 mg tablet Take 1.5 tablets (7.5 mg) on Tuesday and Thursday and 1 tablet (5 mg) all other days 96 tablet 1    acetaminophen (Tylenol) 500 mg tablet Take 1 tablet (500 mg) by mouth every 6 hours if needed for mild pain (1 - 3).      atorvastatin (Lipitor) 20 mg tablet TAKE 1 TABLET DAILY 90 tablet 1    dilTIAZem CD (Cardizem CD) 180 mg 24 hr capsule TAKE 1 CAPSULE DAILY 90 capsule 1    donepezil (Aricept) 5 mg tablet Take 1 tablet (5 mg) by mouth once daily.      EPINEPHrine 0.3 mg/0.3 mL injection syringe Inject 0.3 mL (0.3 mg) as directed 1 time.      furosemide (Lasix) 40 mg tablet Take 1 tablet (40 mg) by mouth once daily. 90 tablet 0    levothyroxine (Synthroid, Levoxyl) 75 mcg tablet TAKE 1 TABLET ONCE DAILY 90 tablet 1    multivitamin tablet Take 1 tablet by mouth once daily.      warfarin (Coumadin) 1 mg tablet Take by mouth.      [DISCONTINUED] enoxaparin (Lovenox) 60 mg/0.6 mL syringe Inject 0.6 mL (60 mg) under the skin 2 times a day.      [DISCONTINUED] FIBER, CALCIUM POLYCARBOPHIL, ORAL Take by mouth.      [DISCONTINUED] gabapentin (Neurontin) 100 mg capsule Take 1 capsule (100 mg) by mouth 3 times a day.      [DISCONTINUED] methocarbamol  (Robaxin) 750 mg tablet Take 1 tablet (750 mg) by mouth once daily at bedtime.      [DISCONTINUED] oxyCODONE (Roxicodone) 5 mg immediate release tablet Take 1 tablet (5 mg) by mouth every 6 hours if needed.      [DISCONTINUED] traMADol (Ultram) 50 mg tablet Take 1 tablet (50 mg) by mouth every 6 hours if needed for pain.       No current facility-administered medications on file prior to visit.        Objective   INR Today: 1.9  Previous INR: 1.8    Anticoagulation Summary  As of 2023      INR goal:  2.0-3.0   TTR:  10.2 % (3.9 wk)   INR used for dosin.90 (2023)   Weekly warfarin total:  45 mg             Lab Results   Component Value Date    INR 1.90 (A) 2023    INR 1.90 (A) 2023    INR 1.80 (A) 2023    INR 1.80 (A) 10/31/2023    INR 2.30 (N) 10/17/2023    INR 1.60 (A) 10/10/2023    PROTIME 12.6 2021    PROTIME 10.3 2020    PROTIME 21.4 (H) 2020       Assessment/Plan   Current INR is Subtherapeutic at 1.9. Previous INR was Subtherapeutic at 1.8. Patient reports 1 missed dose of 7.5 mg 2 days ago. Endorses taking an extra 5 mg yesterday to compensate (10 mg total yesterday). Patient's daughter expressed interest in conversion to Eliquis. Writer will reach out to Dr Jaylen Mendiola to discuss. As INR has been subtherapeutic for multiple weeks, advised to to increase warfarin regimen to 5 mg on // and 7.5 mg all other days.     Follow Up: 2 weeks    Danielle Parker, MendezD, BCPS

## 2023-11-30 ENCOUNTER — ANTICOAGULATION - WARFARIN VISIT (OUTPATIENT)
Dept: PHARMACY | Facility: HOSPITAL | Age: 83
End: 2023-11-30
Payer: MEDICARE

## 2023-11-30 DIAGNOSIS — I48.91 ATRIAL FIBRILLATION, UNSPECIFIED TYPE (MULTI): Primary | ICD-10-CM

## 2023-11-30 LAB
POC INR: 4.5 (ref 2–3)
POC PROTHROMBIN TIME: ABNORMAL

## 2023-11-30 PROCEDURE — 85610 PROTHROMBIN TIME: CPT | Performed by: PHARMACY

## 2023-11-30 NOTE — PROGRESS NOTES
Subjective     Nadeen Moore is a 83 y.o. female who presents for anticoagulation follow up.     Location: [facility]     Referring Physician: Dr Tiny Norris  INR Goal: 2.0-3.0  Indication: Atrial Fibrillation/Atrial Flutter    Bleeding signs/symptoms: No  Bruising: No   Major bleeding event: No  Thrombosis signs/symptoms: No  Thromboembolic event: No  Missed doses: No  Extra doses: No  Medication changes: No  Dietary changes: Yes  Increased intake in cranberry  Change in health: No  Change in activity: No  Alcohol: No  Other concerns: No  Upcoming Surgeries: no      Current Outpatient Medications on File Prior to Visit   Medication Sig Dispense Refill    acetaminophen (Tylenol) 500 mg tablet Take 1 tablet (500 mg) by mouth every 6 hours if needed for mild pain (1 - 3).      atorvastatin (Lipitor) 20 mg tablet TAKE 1 TABLET DAILY 90 tablet 1    dilTIAZem CD (Cardizem CD) 180 mg 24 hr capsule TAKE 1 CAPSULE DAILY 90 capsule 1    donepezil (Aricept) 5 mg tablet Take 1 tablet (5 mg) by mouth once daily.      EPINEPHrine 0.3 mg/0.3 mL injection syringe Inject 0.3 mL (0.3 mg) as directed 1 time.      furosemide (Lasix) 40 mg tablet Take 1 tablet (40 mg) by mouth once daily. 90 tablet 0    levothyroxine (Synthroid, Levoxyl) 75 mcg tablet TAKE 1 TABLET ONCE DAILY 90 tablet 1    multivitamin tablet Take 1 tablet by mouth once daily.      warfarin (Coumadin) 1 mg tablet Take by mouth.      warfarin (Coumadin) 5 mg tablet Take 1.5 tablets (7.5 mg) on Tuesday and Thursday and 1 tablet (5 mg) all other days 96 tablet 1     No current facility-administered medications on file prior to visit.        Objective   Anticoagulation Summary  As of 2023      INR goal:  2.0-3.0   TTR:  20.0 % (1.4 mo)   INR used for dosin.50 (2023)   Weekly warfarin total:  45 mg             Lab Results   Component Value Date    INR 4.50 (A) 2023    INR 1.90 (A) 2023    INR 1.90  (A) 11/16/2023    INR 1.80 (A) 11/09/2023    INR 1.80 (A) 10/31/2023    INR 2.30 (N) 10/17/2023    PROTIME 12.6 06/04/2021    PROTIME 10.3 12/11/2020    PROTIME 21.4 (H) 01/23/2020       Assessment/Plan   Current INR is Supratherapeutic at 4.5. Previous INR was Subtherapeutic at 1.9. Patient endorses eating an extra serving of cranberry daily for the last week. No other identifiable causes for supratherapeutic INR found. Patient to call to set up delivery of home INR test strips today. Advised patient to hold x1 today, take 2.5 mg x1 tomorrow, then resume current warfarin regimen of 5 mg Mon/Wed/Fri, and 7.5 mg all other days.     Follow Up: Early next week with home INR if tests strips available, if not in clinic visit in 2 weeks    Danielle Parker, MendezD, BCPS

## 2023-12-11 ENCOUNTER — ANTICOAGULATION - WARFARIN VISIT (OUTPATIENT)
Dept: PHARMACY | Facility: HOSPITAL | Age: 83
End: 2023-12-11
Payer: MEDICARE

## 2023-12-11 DIAGNOSIS — I48.91 ATRIAL FIBRILLATION, UNSPECIFIED TYPE (MULTI): Primary | ICD-10-CM

## 2023-12-11 LAB
POC INR: 2.1 (ref 2–3)
POC PROTHROMBIN TIME: ABNORMAL

## 2023-12-11 PROCEDURE — 85610 PROTHROMBIN TIME: CPT | Performed by: PHARMACY

## 2023-12-11 NOTE — PROGRESS NOTES
Subjective     Nadeen Moore is a 83 y.o. female who presents for anticoagulation follow up.     Location: [facility]     Referring Physician: Dr. Tiny Norris  INR Goal: 2.0-3.0  Indication: Atrial Fibrillation/Atrial Flutter    Bleeding signs/symptoms: No  Bruising: No   Major bleeding event: No  Thrombosis signs/symptoms: No  Thromboembolic event: No  Missed doses: No  Extra doses: No  Medication changes: No  Dietary changes: No  Change in health: No  Change in activity: No  Alcohol: No  Other concerns: No  Upcoming Surgeries: no      Current Outpatient Medications on File Prior to Visit   Medication Sig Dispense Refill    acetaminophen (Tylenol) 500 mg tablet Take 1 tablet (500 mg) by mouth every 6 hours if needed for mild pain (1 - 3).      atorvastatin (Lipitor) 20 mg tablet TAKE 1 TABLET DAILY 90 tablet 1    dilTIAZem CD (Cardizem CD) 180 mg 24 hr capsule TAKE 1 CAPSULE DAILY 90 capsule 1    donepezil (Aricept) 5 mg tablet Take 1 tablet (5 mg) by mouth once daily.      EPINEPHrine 0.3 mg/0.3 mL injection syringe Inject 0.3 mL (0.3 mg) as directed 1 time.      furosemide (Lasix) 40 mg tablet Take 1 tablet (40 mg) by mouth once daily. 90 tablet 0    levothyroxine (Synthroid, Levoxyl) 75 mcg tablet TAKE 1 TABLET ONCE DAILY 90 tablet 1    multivitamin tablet Take 1 tablet by mouth once daily.      warfarin (Coumadin) 1 mg tablet Take by mouth.      warfarin (Coumadin) 5 mg tablet Take 1.5 tablets (7.5 mg) on Tuesday and Thursday and 1 tablet (5 mg) all other days 96 tablet 1     No current facility-administered medications on file prior to visit.        Objective   Anticoagulation Summary  As of 2023      INR goal:  2.0-3.0   TTR:  23.7 % (1.7 mo)   INR used for dosin.10 (2023)   Weekly warfarin total:  45 mg             Lab Results   Component Value Date    INR 2.10 (N) 2023    INR 4.50 (A) 2023    INR 1.90 (A) 2023    INR 1.90 (A)  11/16/2023    PROTIME 12.6 06/04/2021    PROTIME 10.3 12/11/2020    PROTIME 21.4 (H) 01/23/2020       Assessment/Plan   Current INR is Therapeutic at 2.1. Previous INR was Supratherapeutic at 4.5. Sending result to Dr. York as requested to transition to DOAC. Patient and cardiologist are working on switching from warfarin to Eliquis. Writer discussed the benefits of DOAC therapy including reduced follow up for lab work. Also discussed potential copay concerns. Patient reports that she has Medicare and [employer] coverage and her copay should be reasonable. Patient is agreeable to contacting pharmacy clinic should copay exceed expectations. In the meantime, advised patient to continue current warfarin regimen of 5 mg M/W/F and 7.5 mg all other days until receipt of Eliquis prescription. Writer will contact patient with further instruction at that time.     Based on review of medical record, it is reasonable to transition patient to DOAC therapy. Her recent INRs have been labile and obtaining INR tests at home have been challenging for the patient. Given recent lab work, Eliquis 5 mg BID is appropriate. No contraindications for initiation of Eliquis identified including no history of valvular Afib, antiphospholipid syndrome, or any known reaction to Eliquis.     Follow Up:  As needed    UPDATE: Patient was transitioned to Eliquis by cardiologist. Reports that she is doing well on therapy. No longer in need of INR monitoring with pharmacy clinic. Will discharge from pharmacy service and resolve warfarin anticoagulation episode.     Danielle Parker, MendezD, BCPS

## 2023-12-14 ENCOUNTER — APPOINTMENT (OUTPATIENT)
Dept: PHARMACY | Facility: HOSPITAL | Age: 83
End: 2023-12-14
Payer: MEDICARE

## 2023-12-27 DIAGNOSIS — I10 HYPERTENSION, UNSPECIFIED TYPE: ICD-10-CM

## 2023-12-27 RX ORDER — DILTIAZEM HYDROCHLORIDE 180 MG/1
CAPSULE, COATED, EXTENDED RELEASE ORAL
Qty: 90 CAPSULE | Refills: 1 | Status: SHIPPED | OUTPATIENT
Start: 2023-12-27 | End: 2024-01-18 | Stop reason: SDUPTHER

## 2024-01-08 DIAGNOSIS — I50.9 CONGESTIVE HEART FAILURE, UNSPECIFIED HF CHRONICITY, UNSPECIFIED HEART FAILURE TYPE (MULTI): ICD-10-CM

## 2024-01-08 RX ORDER — FUROSEMIDE 40 MG/1
40 TABLET ORAL DAILY
Qty: 90 TABLET | Refills: 1 | Status: SHIPPED | OUTPATIENT
Start: 2024-01-08 | End: 2024-05-29 | Stop reason: SDUPTHER

## 2024-01-18 DIAGNOSIS — E78.5 DYSLIPIDEMIA: ICD-10-CM

## 2024-01-18 DIAGNOSIS — I10 HYPERTENSION, UNSPECIFIED TYPE: ICD-10-CM

## 2024-01-18 DIAGNOSIS — E03.9 HYPOTHYROIDISM, UNSPECIFIED TYPE: ICD-10-CM

## 2024-01-18 RX ORDER — LEVOTHYROXINE SODIUM 75 UG/1
75 TABLET ORAL DAILY
Qty: 90 TABLET | Refills: 1 | Status: SHIPPED | OUTPATIENT
Start: 2024-01-18

## 2024-01-18 RX ORDER — ATORVASTATIN CALCIUM 20 MG/1
20 TABLET, FILM COATED ORAL DAILY
Qty: 90 TABLET | Refills: 1 | Status: SHIPPED | OUTPATIENT
Start: 2024-01-18

## 2024-01-18 RX ORDER — DILTIAZEM HYDROCHLORIDE 180 MG/1
180 CAPSULE, COATED, EXTENDED RELEASE ORAL DAILY
Qty: 90 CAPSULE | Refills: 1 | Status: SHIPPED | OUTPATIENT
Start: 2024-01-18

## 2024-02-07 PROCEDURE — RXMED WILLOW AMBULATORY MEDICATION CHARGE

## 2024-02-08 ENCOUNTER — PHARMACY VISIT (OUTPATIENT)
Dept: PHARMACY | Facility: CLINIC | Age: 84
End: 2024-02-08
Payer: MEDICARE

## 2024-03-06 PROCEDURE — RXMED WILLOW AMBULATORY MEDICATION CHARGE

## 2024-03-07 ENCOUNTER — PHARMACY VISIT (OUTPATIENT)
Dept: PHARMACY | Facility: CLINIC | Age: 84
End: 2024-03-07
Payer: MEDICARE

## 2024-03-29 PROCEDURE — RXMED WILLOW AMBULATORY MEDICATION CHARGE

## 2024-04-01 PROCEDURE — RXMED WILLOW AMBULATORY MEDICATION CHARGE

## 2024-04-02 ENCOUNTER — PHARMACY VISIT (OUTPATIENT)
Dept: PHARMACY | Facility: CLINIC | Age: 84
End: 2024-04-02
Payer: MEDICARE

## 2024-04-29 PROCEDURE — RXMED WILLOW AMBULATORY MEDICATION CHARGE

## 2024-04-30 ENCOUNTER — PHARMACY VISIT (OUTPATIENT)
Dept: PHARMACY | Facility: CLINIC | Age: 84
End: 2024-04-30
Payer: MEDICARE

## 2024-04-30 PROCEDURE — RXMED WILLOW AMBULATORY MEDICATION CHARGE

## 2024-05-01 PROCEDURE — RXMED WILLOW AMBULATORY MEDICATION CHARGE

## 2024-05-02 ENCOUNTER — TELEPHONE (OUTPATIENT)
Dept: PRIMARY CARE | Facility: CLINIC | Age: 84
End: 2024-05-02
Payer: MEDICARE

## 2024-05-03 ENCOUNTER — PHARMACY VISIT (OUTPATIENT)
Dept: PHARMACY | Facility: CLINIC | Age: 84
End: 2024-05-03
Payer: MEDICARE

## 2024-05-06 PROCEDURE — RXMED WILLOW AMBULATORY MEDICATION CHARGE

## 2024-05-09 ENCOUNTER — PHARMACY VISIT (OUTPATIENT)
Dept: PHARMACY | Facility: CLINIC | Age: 84
End: 2024-05-09
Payer: MEDICARE

## 2024-05-23 ENCOUNTER — PHARMACY VISIT (OUTPATIENT)
Dept: PHARMACY | Facility: CLINIC | Age: 84
End: 2024-05-23
Payer: MEDICARE

## 2024-05-23 PROCEDURE — RXMED WILLOW AMBULATORY MEDICATION CHARGE

## 2024-05-29 ENCOUNTER — LAB (OUTPATIENT)
Dept: LAB | Facility: LAB | Age: 84
End: 2024-05-29
Payer: MEDICARE

## 2024-05-29 ENCOUNTER — OFFICE VISIT (OUTPATIENT)
Dept: PRIMARY CARE | Facility: CLINIC | Age: 84
End: 2024-05-29
Payer: MEDICARE

## 2024-05-29 VITALS
HEART RATE: 70 BPM | HEIGHT: 63 IN | BODY MASS INDEX: 33.1 KG/M2 | SYSTOLIC BLOOD PRESSURE: 138 MMHG | TEMPERATURE: 97.5 F | WEIGHT: 186.8 LBS | DIASTOLIC BLOOD PRESSURE: 72 MMHG | OXYGEN SATURATION: 96 %

## 2024-05-29 DIAGNOSIS — I26.99 OTHER PULMONARY EMBOLISM WITHOUT ACUTE COR PULMONALE, UNSPECIFIED CHRONICITY (MULTI): ICD-10-CM

## 2024-05-29 DIAGNOSIS — F02.A0 MILD LATE ONSET ALZHEIMER'S DEMENTIA, UNSPECIFIED WHETHER BEHAVIORAL, PSYCHOTIC, OR MOOD DISTURBANCE OR ANXIETY (MULTI): ICD-10-CM

## 2024-05-29 DIAGNOSIS — E03.9 HYPOTHYROIDISM, UNSPECIFIED TYPE: ICD-10-CM

## 2024-05-29 DIAGNOSIS — G30.1 MILD LATE ONSET ALZHEIMER'S DEMENTIA, UNSPECIFIED WHETHER BEHAVIORAL, PSYCHOTIC, OR MOOD DISTURBANCE OR ANXIETY (MULTI): ICD-10-CM

## 2024-05-29 DIAGNOSIS — M46.1 INFLAMMATION OF SACROILIAC JOINT (CMS-HCC): ICD-10-CM

## 2024-05-29 DIAGNOSIS — I48.91 AF (ATRIAL FIBRILLATION) (MULTI): Primary | ICD-10-CM

## 2024-05-29 DIAGNOSIS — E53.8 VITAMIN B12 DEFICIENCY: ICD-10-CM

## 2024-05-29 DIAGNOSIS — G31.84 MCI (MILD COGNITIVE IMPAIRMENT): ICD-10-CM

## 2024-05-29 DIAGNOSIS — E78.00 HYPERCHOLESTEREMIA: ICD-10-CM

## 2024-05-29 DIAGNOSIS — R06.02 SOB (SHORTNESS OF BREATH): ICD-10-CM

## 2024-05-29 DIAGNOSIS — E78.5 DYSLIPIDEMIA: ICD-10-CM

## 2024-05-29 DIAGNOSIS — I50.9 CONGESTIVE HEART FAILURE, UNSPECIFIED HF CHRONICITY, UNSPECIFIED HEART FAILURE TYPE (MULTI): ICD-10-CM

## 2024-05-29 DIAGNOSIS — E55.9 VITAMIN D DEFICIENCY, UNSPECIFIED: ICD-10-CM

## 2024-05-29 DIAGNOSIS — Z79.899 HIGH RISK MEDICATION USE: ICD-10-CM

## 2024-05-29 DIAGNOSIS — I10 HYPERTENSION, UNSPECIFIED TYPE: ICD-10-CM

## 2024-05-29 DIAGNOSIS — R53.83 OTHER FATIGUE: ICD-10-CM

## 2024-05-29 DIAGNOSIS — Z13.89 SCREENING FOR MULTIPLE CONDITIONS: ICD-10-CM

## 2024-05-29 DIAGNOSIS — Z00.00 ROUTINE GENERAL MEDICAL EXAMINATION AT HEALTH CARE FACILITY: Primary | ICD-10-CM

## 2024-05-29 LAB — BNP SERPL-MCNC: 226 PG/ML (ref 0–99)

## 2024-05-29 PROCEDURE — 1036F TOBACCO NON-USER: CPT | Performed by: INTERNAL MEDICINE

## 2024-05-29 PROCEDURE — 36415 COLL VENOUS BLD VENIPUNCTURE: CPT

## 2024-05-29 PROCEDURE — 3075F SYST BP GE 130 - 139MM HG: CPT | Performed by: INTERNAL MEDICINE

## 2024-05-29 PROCEDURE — 1170F FXNL STATUS ASSESSED: CPT | Performed by: INTERNAL MEDICINE

## 2024-05-29 PROCEDURE — RXMED WILLOW AMBULATORY MEDICATION CHARGE

## 2024-05-29 PROCEDURE — 1157F ADVNC CARE PLAN IN RCRD: CPT | Performed by: INTERNAL MEDICINE

## 2024-05-29 PROCEDURE — 1160F RVW MEDS BY RX/DR IN RCRD: CPT | Performed by: INTERNAL MEDICINE

## 2024-05-29 PROCEDURE — 3078F DIAST BP <80 MM HG: CPT | Performed by: INTERNAL MEDICINE

## 2024-05-29 PROCEDURE — G0439 PPPS, SUBSEQ VISIT: HCPCS | Performed by: INTERNAL MEDICINE

## 2024-05-29 PROCEDURE — 1158F ADVNC CARE PLAN TLK DOCD: CPT | Performed by: INTERNAL MEDICINE

## 2024-05-29 PROCEDURE — 1123F ACP DISCUSS/DSCN MKR DOCD: CPT | Performed by: INTERNAL MEDICINE

## 2024-05-29 PROCEDURE — 1159F MED LIST DOCD IN RCRD: CPT | Performed by: INTERNAL MEDICINE

## 2024-05-29 PROCEDURE — 99214 OFFICE O/P EST MOD 30 MIN: CPT | Performed by: INTERNAL MEDICINE

## 2024-05-29 RX ORDER — FUROSEMIDE 40 MG/1
60 TABLET ORAL DAILY
Qty: 135 TABLET | Refills: 1 | Status: SHIPPED | OUTPATIENT
Start: 2024-05-29 | End: 2024-11-25

## 2024-05-29 RX ORDER — DONEPEZIL HYDROCHLORIDE 10 MG/1
10 TABLET, FILM COATED ORAL
Qty: 30 TABLET | Refills: 2 | Status: SHIPPED | OUTPATIENT
Start: 2024-05-29

## 2024-05-29 ASSESSMENT — PATIENT HEALTH QUESTIONNAIRE - PHQ9
SUM OF ALL RESPONSES TO PHQ9 QUESTIONS 1 AND 2: 0
1. LITTLE INTEREST OR PLEASURE IN DOING THINGS: NOT AT ALL
2. FEELING DOWN, DEPRESSED OR HOPELESS: NOT AT ALL

## 2024-05-29 ASSESSMENT — ACTIVITIES OF DAILY LIVING (ADL)
DOING_HOUSEWORK: INDEPENDENT
BATHING: INDEPENDENT
GROCERY_SHOPPING: NEEDS ASSISTANCE
DRESSING: INDEPENDENT
MANAGING_FINANCES: NEEDS ASSISTANCE
TAKING_MEDICATION: NEEDS ASSISTANCE

## 2024-05-29 ASSESSMENT — ENCOUNTER SYMPTOMS
HEADACHES: 0
BRUISES/BLEEDS EASILY: 0
FEVER: 0
SINUS PAIN: 0
PALPITATIONS: 0
DIZZINESS: 0
DIARRHEA: 0
COUGH: 0
UNEXPECTED WEIGHT CHANGE: 0
ARTHRALGIAS: 0
WHEEZING: 0
ABDOMINAL PAIN: 0
SORE THROAT: 0
BLOOD IN STOOL: 0
DIFFICULTY URINATING: 0
FATIGUE: 0

## 2024-05-29 NOTE — PROGRESS NOTES
"Subjective   Patient ID: Nadeen Moore is a 84 y.o. female who presents for Medicare Annual Wellness Visit Subsequent.    HPI       Review of Systems    Objective   No results found for: \"HGBA1C\"   /72   Pulse 70   Temp 36.4 °C (97.5 °F)   Ht 1.6 m (5' 3\")   Wt 84.7 kg (186 lb 12.8 oz)   SpO2 96%   BMI 33.09 kg/m²     Physical Exam    Assessment/Plan   Nadeen was seen today for medicare annual wellness visit subsequent.  Diagnoses and all orders for this visit:  MCI (mild cognitive impairment)  -     donepezil (Aricept) 10 mg tablet; Take 1 tablet (10 mg) by mouth once daily with breakfast.     "

## 2024-05-29 NOTE — PROGRESS NOTES
Subjective   Reason for Visit: Nadeen Moore is an 84 y.o. female here for a Medicare Wellness visit.     Past Medical, Surgical, and Family History reviewed and updated in chart.    Reviewed all medications by prescribing practitioner or clinical pharmacist (such as prescriptions, OTCs, herbal therapies and supplements) and documented in the medical record.    Annual Medicare physical  - Vaccinations needs a booster for Tdap  -Screening mammogram need to proceed in June as recommended new order placed  - Screen for colon cancer obtained no need to repeat  - Screen for depression negative  - Advance of directive reviewed    Follow-up  - Shortness of breath seen by cardiology no significant findings recommendation to proceed with BNP for further recommendation on the patient today  -Follow-up 2D echo in August 2024  - Multinodular goiter ultrasound showed no change normal thyroid function test  Follow-up Dr. Hanna as needed  - Left hip arthroplasty doing well counts about falling risk of prevention  - Patient seen in the [facility] for memory evaluation patient preliminary testing mild dementia counseled about conservative measures for dementia  Continue on Aricept 10 mg no significant change awaiting follow-up  -Chronic atrial fibrillation rate controlled patient has switched to Eliquis doing much better compensated  -Underlying history of DVT and PE atrial fibrillation continue Eliquis indefinitely  -pulmonary embolism compensated no recurrence  -CHF compensated improving patient now taking Lasix 20 milligrams daily blood pressure controlled  - status post right shoulder arthroplasty doing very well  -Proceed with lab results follow-up 6 months        Patient Care Team:  Tiny Norris MD as PCP - General  Tiny Norris MD as PCP - [facility] Attributed Provider     Review of Systems   Constitutional:  Negative for fatigue, fever and unexpected weight change.   HENT:  Negative  "for congestion, ear discharge, ear pain, mouth sores, sinus pain and sore throat.    Eyes:  Negative for visual disturbance.   Respiratory:  Negative for cough and wheezing.    Cardiovascular:  Negative for chest pain, palpitations and leg swelling.   Gastrointestinal:  Negative for abdominal pain, blood in stool and diarrhea.   Genitourinary:  Negative for difficulty urinating.   Musculoskeletal:  Negative for arthralgias.   Skin:  Negative for rash.   Neurological:  Negative for dizziness and headaches.   Hematological:  Does not bruise/bleed easily.   Psychiatric/Behavioral:  Negative for behavioral problems.    All other systems reviewed and are negative.      Objective   Vitals:  /72   Pulse 70   Temp 36.4 °C (97.5 °F)   Ht 1.6 m (5' 3\")   Wt 84.7 kg (186 lb 12.8 oz)   SpO2 96%   BMI 33.09 kg/m²       Physical Exam  Vitals and nursing note reviewed.   Constitutional:       Appearance: Normal appearance.   HENT:      Head: Normocephalic.      Nose: Nose normal.   Eyes:      Conjunctiva/sclera: Conjunctivae normal.      Pupils: Pupils are equal, round, and reactive to light.   Cardiovascular:      Rate and Rhythm: Regular rhythm.   Pulmonary:      Effort: Pulmonary effort is normal.      Breath sounds: Normal breath sounds.   Abdominal:      General: Abdomen is flat.      Palpations: Abdomen is soft.   Musculoskeletal:      Cervical back: Neck supple.   Skin:     General: Skin is warm.   Neurological:      General: No focal deficit present.      Mental Status: She is oriented to person, place, and time.   Psychiatric:         Mood and Affect: Mood normal.         Assessment/Plan   Problem List Items Addressed This Visit       CHF (congestive heart failure) (Multi)    Dyslipidemia    Hypertension    Relevant Orders    Comprehensive Metabolic Panel    Pulmonary embolism (Multi)    Hypothyroidism    MCI (mild cognitive impairment)    Relevant Medications    donepezil (Aricept) 10 mg tablet    Mild late " onset Alzheimer's dementia, unspecified whether behavioral, psychotic, or mood disturbance or anxiety (Multi)    Inflammation of sacroiliac joint (CMS-HCC)     Other Visit Diagnoses       Routine general medical examination at health care facility    -  Primary    Relevant Medications    diphth,pertus,acell,,tetanus (BoostRIX) 2.5-8-5 Lf-mcg-Lf/0.5mL injection    High risk medication use        Relevant Orders    CBC and Auto Differential    Hypercholesteremia        Relevant Orders    Lipid Panel    Vitamin B12 deficiency        Relevant Orders    Vitamin B12    Vitamin D deficiency, unspecified        Relevant Orders    Vitamin D 25-Hydroxy,Total (for eval of Vitamin D levels)    Other fatigue        Relevant Orders    TSH with reflex to Free T4 if abnormal    SOB (shortness of breath)        Relevant Orders    B-type natriuretic peptide    Screening for multiple conditions            Annual Medicare physical  - Vaccinations needs a booster for Tdap  -Screening mammogram need to proceed in June as recommended new order placed  - Screen for colon cancer obtained no need to repeat  - Screen for depression negative  - Advance of directive reviewed    Follow-up  - Shortness of breath seen by cardiology no significant findings recommendation to proceed with BNP for further recommendation on the patient today  -Follow-up 2D echo in August 2024  - Multinodular goiter ultrasound showed no change normal thyroid function test  Follow-up Dr. Hanna as needed  - Left hip arthroplasty doing well counts about falling risk of prevention  - Patient seen in the [facility] for memory evaluation patient preliminary testing mild dementia counseled about conservative measures for dementia  Continue on Aricept 10 mg no significant change awaiting follow-up  -Chronic atrial fibrillation rate controlled patient has switched to Eliquis doing much better compensated  -Underlying history of DVT and PE atrial  fibrillation continue Eliquis indefinitely  -pulmonary embolism compensated no recurrence  -CHF compensated improving patient now taking Lasix 20 milligrams daily blood pressure controlled  - status post right shoulder arthroplasty doing very well  -Proceed with lab results follow-up 6 months

## 2024-06-06 ENCOUNTER — PHARMACY VISIT (OUTPATIENT)
Dept: PHARMACY | Facility: CLINIC | Age: 84
End: 2024-06-06
Payer: MEDICARE

## 2024-07-01 PROCEDURE — RXMED WILLOW AMBULATORY MEDICATION CHARGE

## 2024-07-02 ENCOUNTER — PHARMACY VISIT (OUTPATIENT)
Dept: PHARMACY | Facility: CLINIC | Age: 84
End: 2024-07-02
Payer: MEDICARE

## 2024-07-02 PROCEDURE — RXMED WILLOW AMBULATORY MEDICATION CHARGE

## 2024-07-02 RX ORDER — AMMONIUM LACTATE 12 G/100G
CREAM TOPICAL
Qty: 385 G | Refills: 5 | OUTPATIENT
Start: 2024-06-26

## 2024-07-05 ENCOUNTER — PHARMACY VISIT (OUTPATIENT)
Dept: PHARMACY | Facility: CLINIC | Age: 84
End: 2024-07-05
Payer: MEDICARE

## 2024-07-25 DIAGNOSIS — E03.9 HYPOTHYROIDISM, UNSPECIFIED TYPE: ICD-10-CM

## 2024-07-25 PROCEDURE — RXMED WILLOW AMBULATORY MEDICATION CHARGE

## 2024-07-25 RX ORDER — LEVOTHYROXINE SODIUM 75 UG/1
75 TABLET ORAL DAILY
Qty: 90 TABLET | Refills: 1 | Status: SHIPPED | OUTPATIENT
Start: 2024-07-25

## 2024-07-26 PROCEDURE — RXMED WILLOW AMBULATORY MEDICATION CHARGE

## 2024-07-29 PROCEDURE — RXMED WILLOW AMBULATORY MEDICATION CHARGE

## 2024-07-31 ENCOUNTER — PHARMACY VISIT (OUTPATIENT)
Dept: PHARMACY | Facility: CLINIC | Age: 84
End: 2024-07-31
Payer: MEDICARE

## 2024-08-14 PROCEDURE — RXMED WILLOW AMBULATORY MEDICATION CHARGE

## 2024-08-15 ENCOUNTER — PHARMACY VISIT (OUTPATIENT)
Dept: PHARMACY | Facility: CLINIC | Age: 84
End: 2024-08-15
Payer: MEDICARE

## 2024-08-28 PROCEDURE — RXMED WILLOW AMBULATORY MEDICATION CHARGE

## 2024-08-29 ENCOUNTER — PHARMACY VISIT (OUTPATIENT)
Dept: PHARMACY | Facility: CLINIC | Age: 84
End: 2024-08-29
Payer: MEDICARE

## 2024-09-16 PROCEDURE — RXMED WILLOW AMBULATORY MEDICATION CHARGE

## 2024-09-17 ENCOUNTER — PHARMACY VISIT (OUTPATIENT)
Dept: PHARMACY | Facility: CLINIC | Age: 84
End: 2024-09-17
Payer: MEDICARE

## 2024-09-23 DIAGNOSIS — I48.91 AF (ATRIAL FIBRILLATION) (MULTI): ICD-10-CM

## 2024-09-24 PROCEDURE — RXMED WILLOW AMBULATORY MEDICATION CHARGE

## 2024-09-26 ENCOUNTER — PHARMACY VISIT (OUTPATIENT)
Dept: PHARMACY | Facility: CLINIC | Age: 84
End: 2024-09-26
Payer: MEDICARE

## 2024-09-26 DIAGNOSIS — I50.9 CONGESTIVE HEART FAILURE, UNSPECIFIED HF CHRONICITY, UNSPECIFIED HEART FAILURE TYPE: ICD-10-CM

## 2024-09-26 RX ORDER — FUROSEMIDE 40 MG/1
60 TABLET ORAL DAILY
Qty: 135 TABLET | Refills: 1 | Status: SHIPPED | OUTPATIENT
Start: 2024-09-26

## 2024-09-27 ENCOUNTER — PHARMACY VISIT (OUTPATIENT)
Dept: PHARMACY | Facility: CLINIC | Age: 84
End: 2024-09-27
Payer: MEDICARE

## 2024-09-27 PROCEDURE — RXMED WILLOW AMBULATORY MEDICATION CHARGE

## 2024-10-22 PROCEDURE — RXMED WILLOW AMBULATORY MEDICATION CHARGE

## 2024-10-23 DIAGNOSIS — E78.5 DYSLIPIDEMIA: ICD-10-CM

## 2024-10-23 RX ORDER — ATORVASTATIN CALCIUM 20 MG/1
20 TABLET, FILM COATED ORAL DAILY
Qty: 90 TABLET | Refills: 0 | Status: CANCELLED | OUTPATIENT
Start: 2024-10-23

## 2024-10-24 DIAGNOSIS — E78.5 DYSLIPIDEMIA: ICD-10-CM

## 2024-10-25 PROCEDURE — RXMED WILLOW AMBULATORY MEDICATION CHARGE

## 2024-10-25 RX ORDER — ATORVASTATIN CALCIUM 20 MG/1
20 TABLET, FILM COATED ORAL DAILY
Qty: 90 TABLET | Refills: 0 | Status: SHIPPED | OUTPATIENT
Start: 2024-10-25

## 2024-10-28 ENCOUNTER — PHARMACY VISIT (OUTPATIENT)
Dept: PHARMACY | Facility: CLINIC | Age: 84
End: 2024-10-28
Payer: MEDICARE

## 2024-10-28 PROCEDURE — RXMED WILLOW AMBULATORY MEDICATION CHARGE

## 2024-10-29 DIAGNOSIS — G31.84 MCI (MILD COGNITIVE IMPAIRMENT): ICD-10-CM

## 2024-10-29 PROCEDURE — RXMED WILLOW AMBULATORY MEDICATION CHARGE

## 2024-10-29 RX ORDER — DONEPEZIL HYDROCHLORIDE 10 MG/1
10 TABLET, FILM COATED ORAL
Qty: 30 TABLET | Refills: 2 | Status: SHIPPED | OUTPATIENT
Start: 2024-10-29

## 2024-11-12 DIAGNOSIS — I10 HYPERTENSION, UNSPECIFIED TYPE: ICD-10-CM

## 2024-11-12 PROCEDURE — RXMED WILLOW AMBULATORY MEDICATION CHARGE

## 2024-11-12 RX ORDER — DILTIAZEM HYDROCHLORIDE 180 MG/1
180 CAPSULE, COATED, EXTENDED RELEASE ORAL DAILY
Qty: 90 CAPSULE | Refills: 1 | Status: SHIPPED | OUTPATIENT
Start: 2024-11-12

## 2024-11-20 PROCEDURE — RXMED WILLOW AMBULATORY MEDICATION CHARGE

## 2024-11-21 ENCOUNTER — PHARMACY VISIT (OUTPATIENT)
Dept: PHARMACY | Facility: CLINIC | Age: 84
End: 2024-11-21
Payer: MEDICARE

## 2024-11-28 ENCOUNTER — APPOINTMENT (OUTPATIENT)
Dept: RADIOLOGY | Facility: HOSPITAL | Age: 84
End: 2024-11-28
Payer: MEDICARE

## 2024-11-28 ENCOUNTER — HOSPITAL ENCOUNTER (EMERGENCY)
Facility: HOSPITAL | Age: 84
Discharge: HOME | End: 2024-11-28
Attending: EMERGENCY MEDICINE
Payer: MEDICARE

## 2024-11-28 VITALS
DIASTOLIC BLOOD PRESSURE: 65 MMHG | WEIGHT: 187 LBS | TEMPERATURE: 98.6 F | SYSTOLIC BLOOD PRESSURE: 127 MMHG | HEART RATE: 73 BPM | OXYGEN SATURATION: 93 % | RESPIRATION RATE: 25 BRPM | BODY MASS INDEX: 33.13 KG/M2 | HEIGHT: 63 IN

## 2024-11-28 DIAGNOSIS — I50.43 ACUTE ON CHRONIC COMBINED SYSTOLIC AND DIASTOLIC CONGESTIVE HEART FAILURE: Primary | ICD-10-CM

## 2024-11-28 LAB
ALBUMIN SERPL BCP-MCNC: 4.7 G/DL (ref 3.4–5)
ALP SERPL-CCNC: 64 U/L (ref 33–136)
ALT SERPL W P-5'-P-CCNC: 14 U/L (ref 7–45)
ANION GAP SERPL CALC-SCNC: 15 MMOL/L (ref 10–20)
APPEARANCE UR: ABNORMAL
AST SERPL W P-5'-P-CCNC: 19 U/L (ref 9–39)
BASOPHILS # BLD AUTO: 0.03 X10*3/UL (ref 0–0.1)
BASOPHILS NFR BLD AUTO: 0.3 %
BILIRUB SERPL-MCNC: 0.4 MG/DL (ref 0–1.2)
BILIRUB UR STRIP.AUTO-MCNC: NEGATIVE MG/DL
BNP SERPL-MCNC: 295 PG/ML (ref 0–99)
BUN SERPL-MCNC: 25 MG/DL (ref 6–23)
CALCIUM SERPL-MCNC: 9.7 MG/DL (ref 8.6–10.3)
CARDIAC TROPONIN I PNL SERPL HS: 31 NG/L (ref 0–13)
CARDIAC TROPONIN I PNL SERPL HS: 36 NG/L (ref 0–13)
CARDIAC TROPONIN I PNL SERPL HS: 36 NG/L (ref 0–13)
CHLORIDE SERPL-SCNC: 103 MMOL/L (ref 98–107)
CO2 SERPL-SCNC: 26 MMOL/L (ref 21–32)
COLOR UR: ABNORMAL
CREAT SERPL-MCNC: 1.36 MG/DL (ref 0.5–1.05)
D DIMER PPP FEU-MCNC: 328 NG/ML FEU
EGFRCR SERPLBLD CKD-EPI 2021: 38 ML/MIN/1.73M*2
EOSINOPHIL # BLD AUTO: 0.17 X10*3/UL (ref 0–0.4)
EOSINOPHIL NFR BLD AUTO: 1.7 %
ERYTHROCYTE [DISTWIDTH] IN BLOOD BY AUTOMATED COUNT: 12.5 % (ref 11.5–14.5)
FLUAV RNA RESP QL NAA+PROBE: NOT DETECTED
FLUBV RNA RESP QL NAA+PROBE: NOT DETECTED
GLUCOSE SERPL-MCNC: 111 MG/DL (ref 74–99)
GLUCOSE UR STRIP.AUTO-MCNC: NORMAL MG/DL
HCT VFR BLD AUTO: 41.8 % (ref 36–46)
HGB BLD-MCNC: 13.6 G/DL (ref 12–16)
HYALINE CASTS #/AREA URNS AUTO: ABNORMAL /LPF
IMM GRANULOCYTES # BLD AUTO: 0.03 X10*3/UL (ref 0–0.5)
IMM GRANULOCYTES NFR BLD AUTO: 0.3 % (ref 0–0.9)
KETONES UR STRIP.AUTO-MCNC: NEGATIVE MG/DL
LEUKOCYTE ESTERASE UR QL STRIP.AUTO: ABNORMAL
LYMPHOCYTES # BLD AUTO: 1.31 X10*3/UL (ref 0.8–3)
LYMPHOCYTES NFR BLD AUTO: 13.3 %
MAGNESIUM SERPL-MCNC: 2.02 MG/DL (ref 1.6–2.4)
MCH RBC QN AUTO: 33.5 PG (ref 26–34)
MCHC RBC AUTO-ENTMCNC: 32.5 G/DL (ref 32–36)
MCV RBC AUTO: 103 FL (ref 80–100)
MONOCYTES # BLD AUTO: 1.07 X10*3/UL (ref 0.05–0.8)
MONOCYTES NFR BLD AUTO: 10.8 %
MUCOUS THREADS #/AREA URNS AUTO: ABNORMAL /LPF
NEUTROPHILS # BLD AUTO: 7.26 X10*3/UL (ref 1.6–5.5)
NEUTROPHILS NFR BLD AUTO: 73.6 %
NITRITE UR QL STRIP.AUTO: NEGATIVE
NRBC BLD-RTO: 0 /100 WBCS (ref 0–0)
PH UR STRIP.AUTO: 5 [PH]
PLATELET # BLD AUTO: 155 X10*3/UL (ref 150–450)
POTASSIUM SERPL-SCNC: 4.1 MMOL/L (ref 3.5–5.3)
PROT SERPL-MCNC: 7.3 G/DL (ref 6.4–8.2)
PROT UR STRIP.AUTO-MCNC: NEGATIVE MG/DL
RBC # BLD AUTO: 4.06 X10*6/UL (ref 4–5.2)
RBC # UR STRIP.AUTO: NEGATIVE /UL
RBC #/AREA URNS AUTO: ABNORMAL /HPF
RSV RNA RESP QL NAA+PROBE: NOT DETECTED
SARS-COV-2 RNA RESP QL NAA+PROBE: NOT DETECTED
SODIUM SERPL-SCNC: 140 MMOL/L (ref 136–145)
SP GR UR STRIP.AUTO: 1.01
SQUAMOUS #/AREA URNS AUTO: ABNORMAL /HPF
UROBILINOGEN UR STRIP.AUTO-MCNC: NORMAL MG/DL
WBC # BLD AUTO: 9.9 X10*3/UL (ref 4.4–11.3)
WBC #/AREA URNS AUTO: ABNORMAL /HPF
WBC CLUMPS #/AREA URNS AUTO: ABNORMAL /HPF

## 2024-11-28 PROCEDURE — 82435 ASSAY OF BLOOD CHLORIDE: CPT | Performed by: EMERGENCY MEDICINE

## 2024-11-28 PROCEDURE — 84484 ASSAY OF TROPONIN QUANT: CPT | Performed by: EMERGENCY MEDICINE

## 2024-11-28 PROCEDURE — 96374 THER/PROPH/DIAG INJ IV PUSH: CPT

## 2024-11-28 PROCEDURE — 36415 COLL VENOUS BLD VENIPUNCTURE: CPT | Performed by: EMERGENCY MEDICINE

## 2024-11-28 PROCEDURE — 87086 URINE CULTURE/COLONY COUNT: CPT | Mod: GENLAB | Performed by: EMERGENCY MEDICINE

## 2024-11-28 PROCEDURE — 71045 X-RAY EXAM CHEST 1 VIEW: CPT

## 2024-11-28 PROCEDURE — 83735 ASSAY OF MAGNESIUM: CPT | Performed by: EMERGENCY MEDICINE

## 2024-11-28 PROCEDURE — 85379 FIBRIN DEGRADATION QUANT: CPT | Performed by: EMERGENCY MEDICINE

## 2024-11-28 PROCEDURE — 71045 X-RAY EXAM CHEST 1 VIEW: CPT | Performed by: SURGERY

## 2024-11-28 PROCEDURE — 93005 ELECTROCARDIOGRAM TRACING: CPT

## 2024-11-28 PROCEDURE — 83880 ASSAY OF NATRIURETIC PEPTIDE: CPT | Performed by: EMERGENCY MEDICINE

## 2024-11-28 PROCEDURE — 85025 COMPLETE CBC W/AUTO DIFF WBC: CPT | Performed by: EMERGENCY MEDICINE

## 2024-11-28 PROCEDURE — 2500000004 HC RX 250 GENERAL PHARMACY W/ HCPCS (ALT 636 FOR OP/ED): Performed by: EMERGENCY MEDICINE

## 2024-11-28 PROCEDURE — 81001 URINALYSIS AUTO W/SCOPE: CPT | Performed by: EMERGENCY MEDICINE

## 2024-11-28 PROCEDURE — 2500000002 HC RX 250 W HCPCS SELF ADMINISTERED DRUGS (ALT 637 FOR MEDICARE OP, ALT 636 FOR OP/ED): Mod: MUE

## 2024-11-28 PROCEDURE — 87635 SARS-COV-2 COVID-19 AMP PRB: CPT | Performed by: EMERGENCY MEDICINE

## 2024-11-28 PROCEDURE — 99284 EMERGENCY DEPT VISIT MOD MDM: CPT | Mod: 25

## 2024-11-28 PROCEDURE — 94640 AIRWAY INHALATION TREATMENT: CPT

## 2024-11-28 RX ORDER — ACETAMINOPHEN 500 MG
1000 TABLET ORAL 2 TIMES DAILY
COMMUNITY

## 2024-11-28 RX ORDER — FUROSEMIDE 10 MG/ML
40 INJECTION INTRAMUSCULAR; INTRAVENOUS ONCE
Status: COMPLETED | OUTPATIENT
Start: 2024-11-28 | End: 2024-11-28

## 2024-11-28 RX ORDER — IPRATROPIUM BROMIDE AND ALBUTEROL SULFATE 2.5; .5 MG/3ML; MG/3ML
3 SOLUTION RESPIRATORY (INHALATION)
Status: DISCONTINUED | OUTPATIENT
Start: 2024-11-28 | End: 2024-11-29 | Stop reason: HOSPADM

## 2024-11-28 RX ORDER — IPRATROPIUM BROMIDE AND ALBUTEROL SULFATE 2.5; .5 MG/3ML; MG/3ML
SOLUTION RESPIRATORY (INHALATION)
Status: COMPLETED
Start: 2024-11-28 | End: 2024-11-28

## 2024-11-28 RX ADMIN — IPRATROPIUM BROMIDE AND ALBUTEROL SULFATE 3 ML: .5; 3 SOLUTION RESPIRATORY (INHALATION) at 19:33

## 2024-11-28 RX ADMIN — FUROSEMIDE 40 MG: 10 INJECTION, SOLUTION INTRAMUSCULAR; INTRAVENOUS at 19:22

## 2024-11-28 RX ADMIN — IPRATROPIUM BROMIDE AND ALBUTEROL SULFATE 3 ML: 2.5; .5 SOLUTION RESPIRATORY (INHALATION) at 19:33

## 2024-11-28 ASSESSMENT — PAIN SCALES - GENERAL
PAINLEVEL_OUTOF10: 0 - NO PAIN
PAINLEVEL_OUTOF10: 0 - NO PAIN

## 2024-11-28 ASSESSMENT — COLUMBIA-SUICIDE SEVERITY RATING SCALE - C-SSRS
6. HAVE YOU EVER DONE ANYTHING, STARTED TO DO ANYTHING, OR PREPARED TO DO ANYTHING TO END YOUR LIFE?: NO
2. HAVE YOU ACTUALLY HAD ANY THOUGHTS OF KILLING YOURSELF?: NO
1. IN THE PAST MONTH, HAVE YOU WISHED YOU WERE DEAD OR WISHED YOU COULD GO TO SLEEP AND NOT WAKE UP?: NO

## 2024-11-28 ASSESSMENT — PAIN - FUNCTIONAL ASSESSMENT: PAIN_FUNCTIONAL_ASSESSMENT: 0-10

## 2024-11-28 NOTE — ED PROVIDER NOTES
HPI   Chief Complaint   Patient presents with    Shortness of Breath     Increased shortness of breath with exertion today, some cough and congestion she states today . Denies any chest pain        84-year-old female with past medical history significant for atrial fibrillation, CHF, blood clots chronically on Eliquis presents to the emergency department complaining of shortness of breath.  Started slightly yesterday and worse today.  No chest pain.  States she has had a slight cough which was dry yesterday but today seems to be wet.  No phlegm production.  No fevers or chills.  Has been compliant with her medications.  Denies eating more than usual today.  Daughter states the abdomen has been more bloated but patient states she has been moving her bowels fine and denies any abdominal pain.  Denies any additional complaints.              Patient History   Past Medical History:   Diagnosis Date    Personal history of other diseases of the musculoskeletal system and connective tissue     History of arthritis    Personal history of other diseases of the musculoskeletal system and connective tissue 08/12/2015    History of osteoporosis    Personal history of other diseases of the respiratory system     History of asthma    Personal history of other endocrine, nutritional and metabolic disease 08/03/2020    History of thyroid nodule    Personal history of other medical treatment 12/11/2019    History of screening mammography    Unspecified cataract     Cataract, bilateral     Past Surgical History:   Procedure Laterality Date    CATARACT EXTRACTION  11/13/2015    Cataract Surgery    COLONOSCOPY  03/18/2013    Complete Colonoscopy    KNEE ARTHROSCOPY W/ DEBRIDEMENT  07/25/2019    Knee Arthroscopy (Therapeutic)    OTHER SURGICAL HISTORY  05/06/2019    Varicose vein sclerotherapy    OTHER SURGICAL HISTORY  01/11/2014    Vaginal Pap smear    TOTAL KNEE ARTHROPLASTY  07/25/2019    Knee Replacement     No family history on  file.  Social History     Tobacco Use    Smoking status: Never    Smokeless tobacco: Never   Substance Use Topics    Alcohol use: Never    Drug use: Never       Physical Exam   ED Triage Vitals [11/28/24 1840]   Temperature Heart Rate Respirations BP   37 °C (98.6 °F) 81 16 145/69      Pulse Ox Temp Source Heart Rate Source Patient Position   95 % Temporal Monitor Sitting      BP Location FiO2 (%)     Left arm --       Physical Exam  Constitutional:       Appearance: She is well-developed.   HENT:      Head: Normocephalic and atraumatic.   Cardiovascular:      Rate and Rhythm: Normal rate. Rhythm irregular.   Pulmonary:      Effort: Pulmonary effort is normal.      Breath sounds: Examination of the left-middle field reveals rhonchi. Examination of the right-lower field reveals rhonchi. Examination of the left-lower field reveals rhonchi. Rhonchi present.   Chest:      Chest wall: No tenderness.   Abdominal:      Palpations: Abdomen is soft.      Tenderness: There is no abdominal tenderness.   Musculoskeletal:      Comments: Bilateral symmetrical 2+ pitting edema   Skin:     General: Skin is warm and dry.   Neurological:      General: No focal deficit present.      Mental Status: She is alert.   Psychiatric:         Behavior: Behavior normal.           ED Course & MDM   ED Course as of 11/28/24 2216   Thu Nov 28, 2024 1844 EKG which I reviewed and independently interpreted done at 1841 reveals atrial fibrillation with rate of 81 with baseline artifact, normal axis, normal QT QTc, normal intervals, no STEMI. [RM]      ED Course User Index  [RM] Alessandro Valentin MD         Diagnoses as of 11/28/24 2216   Acute on chronic combined systolic and diastolic congestive heart failure                 No data recorded                         Labs Reviewed   CBC WITH AUTO DIFFERENTIAL - Abnormal       Result Value    WBC 9.9      nRBC 0.0      RBC 4.06      Hemoglobin 13.6      Hematocrit 41.8       (*)     MCH 33.5       MCHC 32.5      RDW 12.5      Platelets 155      Neutrophils % 73.6      Immature Granulocytes %, Automated 0.3      Lymphocytes % 13.3      Monocytes % 10.8      Eosinophils % 1.7      Basophils % 0.3      Neutrophils Absolute 7.26 (*)     Immature Granulocytes Absolute, Automated 0.03      Lymphocytes Absolute 1.31      Monocytes Absolute 1.07 (*)     Eosinophils Absolute 0.17      Basophils Absolute 0.03     COMPREHENSIVE METABOLIC PANEL - Abnormal    Glucose 111 (*)     Sodium 140      Potassium 4.1      Chloride 103      Bicarbonate 26      Anion Gap 15      Urea Nitrogen 25 (*)     Creatinine 1.36 (*)     eGFR 38 (*)     Calcium 9.7      Albumin 4.7      Alkaline Phosphatase 64      Total Protein 7.3      AST 19      Bilirubin, Total 0.4      ALT 14     B-TYPE NATRIURETIC PEPTIDE - Abnormal     (*)     Narrative:        <100 pg/mL - Heart failure unlikely  100-299 pg/mL - Intermediate probability of acute heart                  failure exacerbation. Correlate with clinical                  context and patient history.    >=300 pg/mL - Heart Failure likely. Correlate with clinical                  context and patient history.    BNP testing is performed using different testing methodology at [facility] than at other [facility]. Direct result comparisons should only be made within the same method.      SERIAL TROPONIN-INITIAL - Abnormal    Troponin I, High Sensitivity 31 (*)     Narrative:     Less than 99th percentile of normal range cutoff-  Female and children under 18 years old <14 ng/L; Male <21 ng/L: Negative  Repeat testing should be performed if clinically indicated.     Female and children under 18 years old 14-50 ng/L; Male 21-50 ng/L:  Consistent with possible cardiac damage and possible increased clinical   risk. Serial measurements may help to assess extent of myocardial damage.     >50 ng/L: Consistent with cardiac damage, increased clinical risk and  myocardial  infarction. Serial measurements may help assess extent of   myocardial damage.      NOTE: Children less than 1 year old may have higher baseline troponin   levels and results should be interpreted in conjunction with the overall   clinical context.     NOTE: Troponin I testing is performed using a different   testing methodology at [facility] than at other   [facility]. Direct result comparisons should only   be made within the same method.   SERIAL TROPONIN, 1 HOUR - Abnormal    Troponin I, High Sensitivity 36 (*)     Narrative:     Less than 99th percentile of normal range cutoff-  Female and children under 18 years old <14 ng/L; Male <21 ng/L: Negative  Repeat testing should be performed if clinically indicated.     Female and children under 18 years old 14-50 ng/L; Male 21-50 ng/L:  Consistent with possible cardiac damage and possible increased clinical   risk. Serial measurements may help to assess extent of myocardial damage.     >50 ng/L: Consistent with cardiac damage, increased clinical risk and  myocardial infarction. Serial measurements may help assess extent of   myocardial damage.      NOTE: Children less than 1 year old may have higher baseline troponin   levels and results should be interpreted in conjunction with the overall   clinical context.     NOTE: Troponin I testing is performed using a different   testing methodology at [facility] than at other   [facility]. Direct result comparisons should only   be made within the same method.   URINALYSIS WITH REFLEX CULTURE AND MICROSCOPIC - Abnormal    Color, Urine Light-Yellow      Appearance, Urine Turbid (*)     Specific Gravity, Urine 1.014      pH, Urine 5.0      Protein, Urine NEGATIVE      Glucose, Urine Normal      Blood, Urine NEGATIVE      Ketones, Urine NEGATIVE      Bilirubin, Urine NEGATIVE      Urobilinogen, Urine Normal      Nitrite, Urine NEGATIVE      Leukocyte Esterase, Urine 250 Lin/µL (*)     MICROSCOPIC ONLY, URINE - Abnormal    WBC, Urine 6-10 (*)     WBC Clumps, Urine RARE      RBC, Urine 3-5      Squamous Epithelial Cells, Urine 1-9 (SPARSE)      Mucus, Urine FEW      Hyaline Casts, Urine 2+ (*)    TROPONIN I, HIGH SENSITIVITY - Abnormal    Troponin I, High Sensitivity 36 (*)     Narrative:     Less than 99th percentile of normal range cutoff-  Female and children under 18 years old <14 ng/L; Male <21 ng/L: Negative  Repeat testing should be performed if clinically indicated.     Female and children under 18 years old 14-50 ng/L; Male 21-50 ng/L:  Consistent with possible cardiac damage and possible increased clinical   risk. Serial measurements may help to assess extent of myocardial damage.     >50 ng/L: Consistent with cardiac damage, increased clinical risk and  myocardial infarction. Serial measurements may help assess extent of   myocardial damage.      NOTE: Children less than 1 year old may have higher baseline troponin   levels and results should be interpreted in conjunction with the overall   clinical context.     NOTE: Troponin I testing is performed using a different   testing methodology at [facility] than at other   [facility]. Direct result comparisons should only   be made within the same method.   MAGNESIUM - Normal    Magnesium 2.02     SARS-COV-2 PCR - Normal    Coronavirus 2019, PCR Not Detected      Narrative:     This assay has received FDA Emergency Use Authorization (EUA) and is only authorized for the duration of time that circumstances exist to justify the authorization of the emergency use of in vitro diagnostic tests for the detection of SARS-CoV-2 virus and/or diagnosis of COVID-19 infection under section 564(b)(1) of the Act, 21 U.S.C. 360bbb-3(b)(1). This assay is an in vitro diagnostic nucleic acid amplification test for the qualitative detection of SARS-CoV-2 from nasopharyngeal specimens and has been validated for use at [facility]. Negative results do not preclude COVID-19 infections and should not be used as the sole basis for diagnosis, treatment, or other management decisions.     INFLUENZA A AND B PCR - Normal    Flu A Result Not Detected      Flu B Result Not Detected      Narrative:     This assay is an in vitro diagnostic multiplex nucleic acid amplification test for the detection and discrimination of Influenza A & B from nasopharyngeal specimens, and has been validated for use at [facility]. Negative results do not preclude Influenza A/B infections, and should not be used as the sole basis for diagnosis, treatment, or other management decisions. If Influenza A/B and RSV PCR results are negative, testing for Parainfluenza virus, Adenovirus and Metapneumovirus is routinely performed for [facility] pediatric oncology and intensive care inpatients, and is available on other patients by placing an add-on request.   RSV PCR - Normal    RSV PCR Not Detected      Narrative:     This assay is an FDA-cleared, in vitro diagnostic nucleic acid amplification test for the detection of RSV from nasopharyngeal specimens, and has been validated for use at [facility]. Negative results do not preclude RSV infections, and should not be used as the sole basis for diagnosis, treatment, or other management decisions. If Influenza A/B and RSV PCR results are negative, testing for Parainfluenza virus, Adenovirus and Metapneumovirus is routinely performed for pediatric oncology and intensive care inpatients at [facility], and is available on other patients by placing an add-on request.       D-DIMER, VTE EXCLUSION - Normal    D-Dimer, Quantitative VTE Exclusion 328      Narrative:     The VTE Exclusion D-Dimer assay is reported in ng/mL Fibrinogen Equivalent Units (FEU).    Per 's instructions for use, a value of less than 500 ng/mL (FEU) may help to exclude DVT or PE in outpatients when the assay is  used with a clinical pretest probability assessment.(AE must utilize and document eCalc 'Wells Score Deep Vein Thrombosis Risk' for DVT exclusion only. Emergency Department should utilize  Guidelines for Emergency Department Use of the VTE Exclusion D-Dimer and Clinical Pretest probability assessment model for DVT or PE exclusion.)   URINE CULTURE   TROPONIN SERIES- (INITIAL, 1 HR)    Narrative:     The following orders were created for panel order Troponin I Series, High Sensitivity (0, 1 HR).  Procedure                               Abnormality         Status                     ---------                               -----------         ------                     Troponin I, High Sensiti...[[id number]]  Abnormal            Final result               Troponin, High Sensitivi...[[id number]]  Abnormal            Final result                 Please view results for these tests on the individual orders.   URINALYSIS WITH REFLEX CULTURE AND MICROSCOPIC    Narrative:     The following orders were created for panel order Urinalysis with Reflex Culture and Microscopic.  Procedure                               Abnormality         Status                     ---------                               -----------         ------                     Urinalysis with Reflex C...[[id number]]  Abnormal            Final result               Extra Urine Gray Tube[[id number]]                            In process                   Please view results for these tests on the individual orders.   EXTRA URINE GRAY TUBE     XR chest 1 view   Final Result   1.  Pulmonary vascular congestion denotes volume overload, with   prominent interstitium, peribronchial cuffing and Kerley B-lines   compatible with acute pulmonary interstitial edema/CHF. No focal   consolidation. Questionable trace pleural effusions. No pneumothorax.   2. Progressive cardiomegaly relative to 2018.                  MACRO:   None        Signed by: Aayush Kc 11/28/2024  6:57 PM   Dictation workstation:   [id number]                Medical Decision Making  84-year-old female who presents to the emergency department complaining of shortness of breath and cough.  Differential includes CHF, COPD, pneumonia, pulmonary embolism, patient is on Eliquis.  Troponins are 3136 and 36 and she has had no chest pain.  BNP is 295.  Chest x-ray reveals vascular congestion.  No evidence of pneumonia.  BUN is 25 and creatinine is 1.36 with a GFR of 38.  Her previous BUN was around 20 with a creatinine of 0.9.  Patient has been treated with Lasix 40 mg IV and she diuresed over 300 cc.  She feels much better.  She was given a breathing treatment.  Shortness of breath is resolved.  I discussed with the patient admission to the hospital for further diuresis.  She has an appointment to see her cardiologist next week.  She also sees primary care this coming week.  She does not want to stay in the hospital.  Her oxygen saturations are over 95% walking without any shortness of breath.  Daughter is at bedside.  Shared decision making, patient wants to go home.  I feel it is reasonable to have the patient continue her diuretics and control her water intake and follow-up with her [facility] care and cardiology as per appointment.  Patient is to return for any problems.  She understands that she gets worse she needs to return for admission.  Reviewed and independently interpreted the blood work as well as the chest x-ray and EKG.    Procedure  Procedures     Alessandro Valentin MD  11/28/24 2212       Alessandro Valentin MD  11/28/24 2219

## 2024-11-28 NOTE — ED TRIAGE NOTES
Increased shortness of breath with exertion today, some cough and congestion she states today . Denies any chest pain

## 2024-11-29 ENCOUNTER — APPOINTMENT (OUTPATIENT)
Dept: CARDIOLOGY | Facility: HOSPITAL | Age: 84
End: 2024-11-29
Payer: MEDICARE

## 2024-11-29 LAB — HOLD SPECIMEN: NORMAL

## 2024-11-30 LAB — BACTERIA UR CULT: NORMAL

## 2024-12-04 ENCOUNTER — PHARMACY VISIT (OUTPATIENT)
Dept: PHARMACY | Facility: CLINIC | Age: 84
End: 2024-12-04
Payer: MEDICARE

## 2024-12-04 ENCOUNTER — APPOINTMENT (OUTPATIENT)
Dept: PRIMARY CARE | Facility: CLINIC | Age: 84
End: 2024-12-04
Payer: MEDICARE

## 2024-12-04 VITALS
WEIGHT: 189.2 LBS | SYSTOLIC BLOOD PRESSURE: 114 MMHG | TEMPERATURE: 96.9 F | OXYGEN SATURATION: 95 % | DIASTOLIC BLOOD PRESSURE: 60 MMHG | BODY MASS INDEX: 33.52 KG/M2 | HEART RATE: 71 BPM

## 2024-12-04 DIAGNOSIS — I10 HYPERTENSION, UNSPECIFIED TYPE: ICD-10-CM

## 2024-12-04 DIAGNOSIS — E03.9 HYPOTHYROIDISM, UNSPECIFIED TYPE: ICD-10-CM

## 2024-12-04 DIAGNOSIS — E53.8 VITAMIN B12 DEFICIENCY: ICD-10-CM

## 2024-12-04 DIAGNOSIS — E55.9 VITAMIN D DEFICIENCY, UNSPECIFIED: ICD-10-CM

## 2024-12-04 DIAGNOSIS — E78.00 HYPERCHOLESTEREMIA: ICD-10-CM

## 2024-12-04 DIAGNOSIS — J45.40 MODERATE PERSISTENT ASTHMATIC BRONCHITIS WITHOUT COMPLICATION (HHS-HCC): Primary | ICD-10-CM

## 2024-12-04 PROCEDURE — 1160F RVW MEDS BY RX/DR IN RCRD: CPT | Performed by: INTERNAL MEDICINE

## 2024-12-04 PROCEDURE — 1159F MED LIST DOCD IN RCRD: CPT | Performed by: INTERNAL MEDICINE

## 2024-12-04 PROCEDURE — 99214 OFFICE O/P EST MOD 30 MIN: CPT | Performed by: INTERNAL MEDICINE

## 2024-12-04 PROCEDURE — G2211 COMPLEX E/M VISIT ADD ON: HCPCS | Performed by: INTERNAL MEDICINE

## 2024-12-04 PROCEDURE — 1157F ADVNC CARE PLAN IN RCRD: CPT | Performed by: INTERNAL MEDICINE

## 2024-12-04 PROCEDURE — 3074F SYST BP LT 130 MM HG: CPT | Performed by: INTERNAL MEDICINE

## 2024-12-04 PROCEDURE — 1036F TOBACCO NON-USER: CPT | Performed by: INTERNAL MEDICINE

## 2024-12-04 PROCEDURE — 3078F DIAST BP <80 MM HG: CPT | Performed by: INTERNAL MEDICINE

## 2024-12-04 PROCEDURE — RXMED WILLOW AMBULATORY MEDICATION CHARGE

## 2024-12-04 RX ORDER — AZITHROMYCIN 250 MG/1
TABLET, FILM COATED ORAL
Qty: 6 TABLET | Refills: 0 | Status: SHIPPED | OUTPATIENT
Start: 2024-12-04 | End: 2024-12-09

## 2024-12-04 RX ORDER — METHYLPREDNISOLONE 4 MG/1
TABLET ORAL
Qty: 21 TABLET | Refills: 0 | Status: SHIPPED | OUTPATIENT
Start: 2024-12-04 | End: 2024-12-10

## 2024-12-04 ASSESSMENT — ENCOUNTER SYMPTOMS
SINUS COMPLAINT: 1
BLOOD IN STOOL: 0
HEADACHES: 0
DIZZINESS: 0
ARTHRALGIAS: 0
FEVER: 0
SINUS PAIN: 0
BRUISES/BLEEDS EASILY: 0
ABDOMINAL PAIN: 0
WHEEZING: 1
DIFFICULTY URINATING: 0
COUGH: 1
DIARRHEA: 0
PALPITATIONS: 0
SORE THROAT: 0
UNEXPECTED WEIGHT CHANGE: 0
FATIGUE: 0

## 2024-12-04 NOTE — PROGRESS NOTES
Subjective   Patient ID: Nadeen Moore is a 84 y.o. female who presents for Hypothyroidism, ER Follow-up (Chf, did increase furosemide to 40mg bid x 2 days), Sinus Problem (Sinus drainage, clear, cough continues, wheezy), and distention (In abdomen, sob is increased).    - Recent emergency room records reviewed and plan of care reviewed patient went to emergency room for shortness of breath found to be in volume overload given Lasix IV symptoms improved patient declined admission to the hospital discharged home to continue on Lasix 60 mg daily  - Patient counseled about monitoring her weight her current weight 185 pounds need to be used at her baseline if any increase in weight more than 3 pounds need to increase Lasix to take 60 mg twice for 1 day then to resume 60 mg daily  -Patient started having coughing congestion and wheezing  Underlying flareup of asthmatic bronchitis  Need to start on Medrol DosepakFollow-up results closely  Z-Sam also added, continue with rest low-salt diet follow-up with no improvement  - Multinodular goiter ultrasound showed no change normal thyroid function test  Follow-up Dr. Hanna as needed  - Left hip arthroplasty doing well counts about falling risk of prevention.  - Patient seen in the [facility] for memory evaluation patient preliminary testing mild dementia counseled about conservative measures for dementia  Continue on Aricept 10 mg no significant change awaiting follow-up.  -Chronic atrial fibrillation rate controlled patient has switched to Eliquis doing much better compensated  -Underlying history of DVT and PE atrial fibrillation continue Eliquis indefinitely  -pulmonary embolism compensated no recurrence  -CHF compensated improving patient now taking Lasix 20 milligrams daily blood pressure controlled  - status post right shoulder arthroplasty doing very well  -Proceed with lab results follow-up 6 months      ER Follow-up  Associated symptoms  "include coughing. Pertinent negatives include no abdominal pain, arthralgias, chest pain, congestion, fatigue, fever, headaches, rash or sore throat.   Sinus Problem  Associated symptoms include coughing. Pertinent negatives include no congestion, ear pain, headaches or sore throat.          Review of Systems   Constitutional:  Negative for fatigue, fever and unexpected weight change.   HENT:  Negative for congestion, ear discharge, ear pain, mouth sores, sinus pain and sore throat.    Eyes:  Negative for visual disturbance.   Respiratory:  Positive for cough and wheezing.    Cardiovascular:  Negative for chest pain, palpitations and leg swelling.   Gastrointestinal:  Negative for abdominal pain, blood in stool and diarrhea.   Genitourinary:  Negative for difficulty urinating.   Musculoskeletal:  Negative for arthralgias.   Skin:  Negative for rash.   Neurological:  Negative for dizziness and headaches.   Hematological:  Does not bruise/bleed easily.   Psychiatric/Behavioral:  Negative for behavioral problems.    All other systems reviewed and are negative.      Objective   No results found for: \"HGBA1C\"   /60   Pulse 71   Temp 36.1 °C (96.9 °F)   Wt 85.8 kg (189 lb 3.2 oz)   SpO2 95%   BMI 33.52 kg/m²     Physical Exam  Vitals and nursing note reviewed.   Constitutional:       Appearance: Normal appearance.   HENT:      Head: Normocephalic.      Nose: Nose normal.   Eyes:      Conjunctiva/sclera: Conjunctivae normal.      Pupils: Pupils are equal, round, and reactive to light.   Cardiovascular:      Rate and Rhythm: Regular rhythm.   Pulmonary:      Effort: Pulmonary effort is normal.      Breath sounds: Normal breath sounds.   Abdominal:      General: Abdomen is flat.      Palpations: Abdomen is soft.   Musculoskeletal:      Cervical back: Neck supple.   Skin:     General: Skin is warm.   Neurological:      General: No focal deficit present.      Mental Status: She is oriented to person, place, and " time.   Psychiatric:         Mood and Affect: Mood normal.         Assessment/Plan   Nadeen was seen today for hypothyroidism, er follow-up, sinus problem and distention.  Diagnoses and all orders for this visit:  Moderate persistent asthmatic bronchitis without complication ([facility]) (Primary)  -     methylPREDNISolone (Medrol Dospak) 4 mg tablets; Follow schedule on package instructions  -     azithromycin (Zithromax) 250 mg tablet; Take 2 tablets (500 mg) by mouth once daily for 1 day, THEN 1 tablet (250 mg) once daily for 4 days.  Vitamin B12 deficiency  Hypertension, unspecified type  Hypercholesteremia  Vitamin D deficiency, unspecified  Hypothyroidism, unspecified type  Other orders  -     Follow Up In Primary Care - Established; Future   - Recent emergency room records reviewed and plan of care reviewed patient went to emergency room for shortness of breath found to be in volume overload given Lasix IV symptoms improved patient declined admission to the hospital discharged home to continue on Lasix 60 mg daily  - Patient counseled about monitoring her weight her current weight 185 pounds need to be used at her baseline if any increase in weight more than 3 pounds need to increase Lasix to take 60 mg twice for 1 day then to resume 60 mg daily  -Patient started having coughing congestion and wheezing  Underlying flareup of asthmatic bronchitis  Need to start on Medrol DosepakFollow-up results closely  Z-Sam also added, continue with rest low-salt diet follow-up with no improvement  - Multinodular goiter ultrasound showed no change normal thyroid function test  Follow-up Dr. Hanna as needed  - Left hip arthroplasty doing well counts about falling risk of prevention.  - Patient seen in the [facility] for memory evaluation patient preliminary testing mild dementia counseled about conservative measures for dementia  Continue on Aricept 10 mg no significant change awaiting  follow-up.  -Chronic atrial fibrillation rate controlled patient has switched to Eliquis doing much better compensated  -Underlying history of DVT and PE atrial fibrillation continue Eliquis indefinitely  -pulmonary embolism compensated no recurrence  -CHF compensated improving patient now taking Lasix 20 milligrams daily blood pressure controlled  - status post right shoulder arthroplasty doing very well  -Proceed with lab results follow-up 6 months

## 2024-12-08 LAB
ATRIAL RATE: 72 BPM
Q ONSET: 225 MS
QRS COUNT: 13 BEATS
QRS DURATION: 72 MS
QT INTERVAL: 354 MS
QTC CALCULATION(BAZETT): 411 MS
QTC FREDERICIA: 391 MS
R AXIS: 29 DEGREES
T AXIS: 20 DEGREES
T OFFSET: 402 MS
VENTRICULAR RATE: 81 BPM

## 2024-12-20 ENCOUNTER — PHARMACY VISIT (OUTPATIENT)
Dept: PHARMACY | Facility: CLINIC | Age: 84
End: 2024-12-20
Payer: MEDICARE

## 2024-12-20 PROCEDURE — RXMED WILLOW AMBULATORY MEDICATION CHARGE

## 2024-12-30 ENCOUNTER — APPOINTMENT (OUTPATIENT)
Dept: RADIOLOGY | Facility: HOSPITAL | Age: 84
End: 2024-12-30
Payer: MEDICARE

## 2024-12-30 ENCOUNTER — OFFICE VISIT (OUTPATIENT)
Dept: URGENT CARE | Facility: URGENT CARE | Age: 84
End: 2024-12-30
Payer: MEDICARE

## 2024-12-30 ENCOUNTER — PHARMACY VISIT (OUTPATIENT)
Dept: PHARMACY | Facility: CLINIC | Age: 84
End: 2024-12-30
Payer: MEDICARE

## 2024-12-30 ENCOUNTER — ANCILLARY PROCEDURE (OUTPATIENT)
Dept: URGENT CARE | Facility: URGENT CARE | Age: 84
End: 2024-12-30
Payer: MEDICARE

## 2024-12-30 ENCOUNTER — HOSPITAL ENCOUNTER (EMERGENCY)
Facility: HOSPITAL | Age: 84
Discharge: HOME | End: 2024-12-30
Attending: EMERGENCY MEDICINE
Payer: MEDICARE

## 2024-12-30 ENCOUNTER — TELEPHONE (OUTPATIENT)
Dept: PRIMARY CARE | Facility: CLINIC | Age: 84
End: 2024-12-30

## 2024-12-30 VITALS
BODY MASS INDEX: 34.68 KG/M2 | TEMPERATURE: 98.3 F | DIASTOLIC BLOOD PRESSURE: 76 MMHG | OXYGEN SATURATION: 95 % | SYSTOLIC BLOOD PRESSURE: 114 MMHG | RESPIRATION RATE: 18 BRPM | WEIGHT: 195.77 LBS | HEART RATE: 70 BPM

## 2024-12-30 VITALS
RESPIRATION RATE: 20 BRPM | TEMPERATURE: 97.2 F | HEART RATE: 58 BPM | WEIGHT: 195 LBS | DIASTOLIC BLOOD PRESSURE: 61 MMHG | SYSTOLIC BLOOD PRESSURE: 105 MMHG | HEIGHT: 63 IN | BODY MASS INDEX: 34.55 KG/M2 | OXYGEN SATURATION: 94 %

## 2024-12-30 DIAGNOSIS — R14.0 ABDOMINAL DISTENTION: ICD-10-CM

## 2024-12-30 DIAGNOSIS — R19.8 PROTUBERANT ABDOMEN: Primary | ICD-10-CM

## 2024-12-30 DIAGNOSIS — R14.0 ABDOMINAL DISTENTION: Primary | ICD-10-CM

## 2024-12-30 LAB
ALBUMIN SERPL BCP-MCNC: 4.3 G/DL (ref 3.4–5)
ALP SERPL-CCNC: 60 U/L (ref 33–136)
ALT SERPL W P-5'-P-CCNC: 14 U/L (ref 7–45)
ANION GAP SERPL CALC-SCNC: 12 MMOL/L (ref 10–20)
APPEARANCE UR: CLEAR
AST SERPL W P-5'-P-CCNC: 17 U/L (ref 9–39)
BACTERIA #/AREA URNS AUTO: ABNORMAL /HPF
BASOPHILS # BLD AUTO: 0.02 X10*3/UL (ref 0–0.1)
BASOPHILS NFR BLD AUTO: 0.3 %
BILIRUB SERPL-MCNC: 0.5 MG/DL (ref 0–1.2)
BILIRUB UR STRIP.AUTO-MCNC: NEGATIVE MG/DL
BNP SERPL-MCNC: 278 PG/ML (ref 0–99)
BUN SERPL-MCNC: 20 MG/DL (ref 6–23)
CALCIUM SERPL-MCNC: 10 MG/DL (ref 8.6–10.3)
CARDIAC TROPONIN I PNL SERPL HS: 26 NG/L (ref 0–13)
CARDIAC TROPONIN I PNL SERPL HS: 26 NG/L (ref 0–13)
CHLORIDE SERPL-SCNC: 104 MMOL/L (ref 98–107)
CO2 SERPL-SCNC: 31 MMOL/L (ref 21–32)
COLOR UR: YELLOW
CREAT SERPL-MCNC: 1.22 MG/DL (ref 0.5–1.05)
D DIMER PPP FEU-MCNC: 296 NG/ML FEU
EGFRCR SERPLBLD CKD-EPI 2021: 44 ML/MIN/1.73M*2
EOSINOPHIL # BLD AUTO: 0.23 X10*3/UL (ref 0–0.4)
EOSINOPHIL NFR BLD AUTO: 3.2 %
ERYTHROCYTE [DISTWIDTH] IN BLOOD BY AUTOMATED COUNT: 12.7 % (ref 11.5–14.5)
GLUCOSE SERPL-MCNC: 88 MG/DL (ref 74–99)
GLUCOSE UR STRIP.AUTO-MCNC: NORMAL MG/DL
HCT VFR BLD AUTO: 42.3 % (ref 36–46)
HGB BLD-MCNC: 13.7 G/DL (ref 12–16)
IMM GRANULOCYTES # BLD AUTO: 0.01 X10*3/UL (ref 0–0.5)
IMM GRANULOCYTES NFR BLD AUTO: 0.1 % (ref 0–0.9)
KETONES UR STRIP.AUTO-MCNC: NEGATIVE MG/DL
LACTATE SERPL-SCNC: 1.4 MMOL/L (ref 0.4–2)
LEUKOCYTE ESTERASE UR QL STRIP.AUTO: ABNORMAL
LIPASE SERPL-CCNC: 31 U/L (ref 9–82)
LYMPHOCYTES # BLD AUTO: 1.77 X10*3/UL (ref 0.8–3)
LYMPHOCYTES NFR BLD AUTO: 24.3 %
MCH RBC QN AUTO: 33.3 PG (ref 26–34)
MCHC RBC AUTO-ENTMCNC: 32.4 G/DL (ref 32–36)
MCV RBC AUTO: 103 FL (ref 80–100)
MONOCYTES # BLD AUTO: 0.61 X10*3/UL (ref 0.05–0.8)
MONOCYTES NFR BLD AUTO: 8.4 %
MUCOUS THREADS #/AREA URNS AUTO: ABNORMAL /LPF
NEUTROPHILS # BLD AUTO: 4.65 X10*3/UL (ref 1.6–5.5)
NEUTROPHILS NFR BLD AUTO: 63.7 %
NITRITE UR QL STRIP.AUTO: NEGATIVE
NRBC BLD-RTO: 0 /100 WBCS (ref 0–0)
PH UR STRIP.AUTO: 5.5 [PH]
PLATELET # BLD AUTO: 199 X10*3/UL (ref 150–450)
POTASSIUM SERPL-SCNC: 4.2 MMOL/L (ref 3.5–5.3)
PROT SERPL-MCNC: 7 G/DL (ref 6.4–8.2)
PROT UR STRIP.AUTO-MCNC: ABNORMAL MG/DL
RBC # BLD AUTO: 4.11 X10*6/UL (ref 4–5.2)
RBC # UR STRIP.AUTO: NEGATIVE /UL
RBC #/AREA URNS AUTO: ABNORMAL /HPF
SODIUM SERPL-SCNC: 143 MMOL/L (ref 136–145)
SP GR UR STRIP.AUTO: 1.03
SQUAMOUS #/AREA URNS AUTO: ABNORMAL /HPF
UROBILINOGEN UR STRIP.AUTO-MCNC: NORMAL MG/DL
WBC # BLD AUTO: 7.3 X10*3/UL (ref 4.4–11.3)
WBC #/AREA URNS AUTO: ABNORMAL /HPF

## 2024-12-30 PROCEDURE — 74177 CT ABD & PELVIS W/CONTRAST: CPT

## 2024-12-30 PROCEDURE — 2500000004 HC RX 250 GENERAL PHARMACY W/ HCPCS (ALT 636 FOR OP/ED): Performed by: EMERGENCY MEDICINE

## 2024-12-30 PROCEDURE — 84484 ASSAY OF TROPONIN QUANT: CPT | Performed by: EMERGENCY MEDICINE

## 2024-12-30 PROCEDURE — 36415 COLL VENOUS BLD VENIPUNCTURE: CPT | Performed by: EMERGENCY MEDICINE

## 2024-12-30 PROCEDURE — 3078F DIAST BP <80 MM HG: CPT

## 2024-12-30 PROCEDURE — 1126F AMNT PAIN NOTED NONE PRSNT: CPT

## 2024-12-30 PROCEDURE — 74018 RADEX ABDOMEN 1 VIEW: CPT

## 2024-12-30 PROCEDURE — 87086 URINE CULTURE/COLONY COUNT: CPT | Mod: GENLAB | Performed by: EMERGENCY MEDICINE

## 2024-12-30 PROCEDURE — 96360 HYDRATION IV INFUSION INIT: CPT | Mod: 59

## 2024-12-30 PROCEDURE — 71275 CT ANGIOGRAPHY CHEST: CPT

## 2024-12-30 PROCEDURE — 83880 ASSAY OF NATRIURETIC PEPTIDE: CPT | Performed by: EMERGENCY MEDICINE

## 2024-12-30 PROCEDURE — 99285 EMERGENCY DEPT VISIT HI MDM: CPT | Mod: 25 | Performed by: EMERGENCY MEDICINE

## 2024-12-30 PROCEDURE — 74177 CT ABD & PELVIS W/CONTRAST: CPT | Mod: FOREIGN READ | Performed by: RADIOLOGY

## 2024-12-30 PROCEDURE — 2550000001 HC RX 255 CONTRASTS: Performed by: EMERGENCY MEDICINE

## 2024-12-30 PROCEDURE — 85025 COMPLETE CBC W/AUTO DIFF WBC: CPT | Performed by: EMERGENCY MEDICINE

## 2024-12-30 PROCEDURE — RXMED WILLOW AMBULATORY MEDICATION CHARGE

## 2024-12-30 PROCEDURE — 3074F SYST BP LT 130 MM HG: CPT

## 2024-12-30 PROCEDURE — 99203 OFFICE O/P NEW LOW 30 MIN: CPT

## 2024-12-30 PROCEDURE — 1036F TOBACCO NON-USER: CPT

## 2024-12-30 PROCEDURE — 81001 URINALYSIS AUTO W/SCOPE: CPT | Performed by: EMERGENCY MEDICINE

## 2024-12-30 PROCEDURE — 83605 ASSAY OF LACTIC ACID: CPT | Performed by: EMERGENCY MEDICINE

## 2024-12-30 PROCEDURE — 83690 ASSAY OF LIPASE: CPT | Performed by: EMERGENCY MEDICINE

## 2024-12-30 PROCEDURE — 85379 FIBRIN DEGRADATION QUANT: CPT | Performed by: EMERGENCY MEDICINE

## 2024-12-30 PROCEDURE — 96361 HYDRATE IV INFUSION ADD-ON: CPT

## 2024-12-30 PROCEDURE — 71275 CT ANGIOGRAPHY CHEST: CPT | Mod: FOREIGN READ | Performed by: RADIOLOGY

## 2024-12-30 PROCEDURE — 1159F MED LIST DOCD IN RCRD: CPT

## 2024-12-30 PROCEDURE — 1157F ADVNC CARE PLAN IN RCRD: CPT

## 2024-12-30 PROCEDURE — 80053 COMPREHEN METABOLIC PANEL: CPT | Performed by: EMERGENCY MEDICINE

## 2024-12-30 RX ORDER — SODIUM CHLORIDE 9 MG/ML
75 INJECTION, SOLUTION INTRAVENOUS CONTINUOUS
Status: DISCONTINUED | OUTPATIENT
Start: 2024-12-30 | End: 2024-12-30 | Stop reason: HOSPADM

## 2024-12-30 RX ADMIN — IOHEXOL 75 ML: 350 INJECTION, SOLUTION INTRAVENOUS at 19:31

## 2024-12-30 RX ADMIN — SODIUM CHLORIDE 75 ML/HR: 9 INJECTION, SOLUTION INTRAVENOUS at 18:33

## 2024-12-30 ASSESSMENT — COLUMBIA-SUICIDE SEVERITY RATING SCALE - C-SSRS
2. HAVE YOU ACTUALLY HAD ANY THOUGHTS OF KILLING YOURSELF?: NO
6. HAVE YOU EVER DONE ANYTHING, STARTED TO DO ANYTHING, OR PREPARED TO DO ANYTHING TO END YOUR LIFE?: NO
1. IN THE PAST MONTH, HAVE YOU WISHED YOU WERE DEAD OR WISHED YOU COULD GO TO SLEEP AND NOT WAKE UP?: NO

## 2024-12-30 ASSESSMENT — PAIN - FUNCTIONAL ASSESSMENT: PAIN_FUNCTIONAL_ASSESSMENT: 0-10

## 2024-12-30 ASSESSMENT — PAIN SCALES - GENERAL
PAINLEVEL_OUTOF10: 0-NO PAIN
PAINLEVEL_OUTOF10: 0 - NO PAIN

## 2024-12-30 NOTE — ED TRIAGE NOTES
Abdominal distention x 2 months, increased weight gain. Pt on 60mg lasix daily. Pt had xray at urgent care and they told pt to come to ED for further evaluation

## 2024-12-30 NOTE — ED PROVIDER NOTES
[facility]  ED  Provider Note  12/30/2024  5:25 PM  AC02/AC02              Chief Complaint   Patient presents with    Bloated     Abdominal distention x 2 months, increased weight gain. Pt on 60mg lasix daily. Pt had xray at urgent care and they told pt to come to ED for further evaluation         History of Present Illness:   Nadeen Moore is a 84 y.o. female presenting to the ED for abdominal bloating and shortness of breath, beginning 2 months ago.  The complaint has been persistent, moderate in severity, and worsened by walking.  Patient is an 84 old woman who is gained about 7 pounds in the last few weeks.  She has abdominal distention and increasing dyspnea.  She denies chest pain or abdominal pain.  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  She been placed on Lasix 60 mg daily.  Her last GFR was 42.  A CT scan of abdomen ordered.  We are pending the GFR so we can decide to do the chest with or without contrast.  There is been no recent change in medication.  She has been eating more Pasta recently.      Review of Systems:   Pertinent positives and review of systems as noted above.  Remaining 10 review of systems is negative or noncontributory to today's episode of care.  Review of Systems       --------------------------------------------- PAST HISTORY ---------------------------------------------  Past Medical History:   Diagnosis Date    Personal history of other diseases of the musculoskeletal system and connective tissue     History of arthritis    Personal history of other diseases of the musculoskeletal system and connective tissue 08/12/2015    History of osteoporosis    Personal history of other diseases of the respiratory system     History of asthma    Personal history of other endocrine, nutritional and metabolic disease 08/03/2020    History of thyroid nodule    Personal history of other medical treatment 12/11/2019    History of screening mammography     Unspecified cataract     Cataract, bilateral         has a past surgical history that includes Colonoscopy (03/18/2013); Knee arthroscopy w/ debridement (07/25/2019); Total knee arthroplasty (07/25/2019); Other surgical history (05/06/2019); Cataract extraction (11/13/2015); and Other surgical history (01/11/2014).     reports that she has never smoked. She has never used smokeless tobacco. She reports that she does not drink alcohol and does not use drugs.    family history is not on file. Unless otherwise noted, family history is non contributory    Patient's Medications   New Prescriptions    No medications on file   Previous Medications    ACETAMINOPHEN (TYLENOL) 500 MG TABLET    Take 2 tablets (1,000 mg) by mouth 2 times a day.    AMMONIUM LACTATE (AMLACTIN) 12 % CREAM    Apply topically once daily when skin is damp/wet    APIXABAN (ELIQUIS) 5 MG TABLET    Take 1 tablet (5 mg) by mouth 2 times a day.    ATORVASTATIN (LIPITOR) 20 MG TABLET    Take 1 tablet (20 mg) by mouth once daily.    DILTIAZEM CD (CARDIZEM CD) 180 MG 24 HR CAPSULE    Take 1 capsule (180 mg) by mouth once daily.    DONEPEZIL (ARICEPT) 10 MG TABLET    Take 1 tablet (10 mg) by mouth once daily with breakfast.    FUROSEMIDE (LASIX) 40 MG TABLET    TAKE 1.5( one and one-half) TABLETs(60mg) BY MOUTH once daily    LEVOTHYROXINE (SYNTHROID, LEVOXYL) 75 MCG TABLET    Take 1 tablet (75 mcg) by mouth once daily.    MULTIVITAMIN TABLET    Take 1 tablet by mouth once daily.   Modified Medications    No medications on file   Discontinued Medications    No medications on file      The patient’s home medications have been reviewed.    Allergies: Naproxen, Nepafenac, Bee venom protein (honey bee), and Moexipril-hydrochlorothiazide    -------------------------------------------------- RESULTS -------------------------------------------------  All laboratory and radiology results have been personally reviewed by myself   LABS:  Labs Reviewed   COMPREHENSIVE  METABOLIC PANEL - Abnormal       Result Value    Glucose 88      Sodium 143      Potassium 4.2      Chloride 104      Bicarbonate 31      Anion Gap 12      Urea Nitrogen 20      Creatinine 1.22 (*)     eGFR 44 (*)     Calcium 10.0      Albumin 4.3      Alkaline Phosphatase 60      Total Protein 7.0      AST 17      Bilirubin, Total 0.5      ALT 14     CBC WITH AUTO DIFFERENTIAL - Abnormal    WBC 7.3      nRBC 0.0      RBC 4.11      Hemoglobin 13.7      Hematocrit 42.3       (*)     MCH 33.3      MCHC 32.4      RDW 12.7      Platelets 199      Neutrophils % 63.7      Immature Granulocytes %, Automated 0.1      Lymphocytes % 24.3      Monocytes % 8.4      Eosinophils % 3.2      Basophils % 0.3      Neutrophils Absolute 4.65      Immature Granulocytes Absolute, Automated 0.01      Lymphocytes Absolute 1.77      Monocytes Absolute 0.61      Eosinophils Absolute 0.23      Basophils Absolute 0.02     URINALYSIS WITH REFLEX CULTURE AND MICROSCOPIC - Abnormal    Color, Urine Yellow      Appearance, Urine Clear      Specific Gravity, Urine 1.032      pH, Urine 5.5      Protein, Urine 10 (TRACE)      Glucose, Urine Normal      Blood, Urine NEGATIVE      Ketones, Urine NEGATIVE      Bilirubin, Urine NEGATIVE      Urobilinogen, Urine Normal      Nitrite, Urine NEGATIVE      Leukocyte Esterase, Urine 250 Lin/µL (*)    B-TYPE NATRIURETIC PEPTIDE - Abnormal     (*)     Narrative:        <100 pg/mL - Heart failure unlikely  100-299 pg/mL - Intermediate probability of acute heart                  failure exacerbation. Correlate with clinical                  context and patient history.    >=300 pg/mL - Heart Failure likely. Correlate with clinical                  context and patient history.    BNP testing is performed using different testing methodology at [facility] than at other [facility] hospitals. Direct result comparisons should only be made within the same method.      SERIAL TROPONIN-INITIAL -  Abnormal    Troponin I, High Sensitivity 26 (*)     Narrative:     Less than 99th percentile of normal range cutoff-  Female and children under 18 years old <14 ng/L; Male <21 ng/L: Negative  Repeat testing should be performed if clinically indicated.     Female and children under 18 years old 14-50 ng/L; Male 21-50 ng/L:  Consistent with possible cardiac damage and possible increased clinical   risk. Serial measurements may help to assess extent of myocardial damage.     >50 ng/L: Consistent with cardiac damage, increased clinical risk and  myocardial infarction. Serial measurements may help assess extent of   myocardial damage.      NOTE: Children less than 1 year old may have higher baseline troponin   levels and results should be interpreted in conjunction with the overall   clinical context.     NOTE: Troponin I testing is performed using a different   testing methodology at [facility] than at other   [facility]. Direct result comparisons should only   be made within the same method.   SERIAL TROPONIN, 1 HOUR - Abnormal    Troponin I, High Sensitivity 26 (*)     Narrative:     Less than 99th percentile of normal range cutoff-  Female and children under 18 years old <14 ng/L; Male <21 ng/L: Negative  Repeat testing should be performed if clinically indicated.     Female and children under 18 years old 14-50 ng/L; Male 21-50 ng/L:  Consistent with possible cardiac damage and possible increased clinical   risk. Serial measurements may help to assess extent of myocardial damage.     >50 ng/L: Consistent with cardiac damage, increased clinical risk and  myocardial infarction. Serial measurements may help assess extent of   myocardial damage.      NOTE: Children less than 1 year old may have higher baseline troponin   levels and results should be interpreted in conjunction with the overall   clinical context.     NOTE: Troponin I testing is performed using a different   testing methodology at  [facility] than at other   [facility]. Direct result comparisons should only   be made within the same method.   MICROSCOPIC ONLY, URINE - Abnormal    WBC, Urine 6-10 (*)     RBC, Urine 1-2      Squamous Epithelial Cells, Urine 1-9 (SPARSE)      Bacteria, Urine 1+ (*)     Mucus, Urine FEW     LACTATE - Normal    Lactate 1.4      Narrative:     Venipuncture immediately after or during the administration of Metamizole may lead to falsely low results. Testing should be performed immediately prior to Metamizole dosing.   LIPASE - Normal    Lipase 31      Narrative:     Venipuncture immediately after or during the administration of Metamizole may lead to falsely low results. Testing should be performed immediately prior to Metamizole dosing.   D-DIMER, VTE EXCLUSION - Normal    D-Dimer, Quantitative VTE Exclusion 296      Narrative:     The VTE Exclusion D-Dimer assay is reported in ng/mL Fibrinogen Equivalent Units (FEU).    Per 's instructions for use, a value of less than 500 ng/mL (FEU) may help to exclude DVT or PE in outpatients when the assay is used with a clinical pretest probability assessment.(AE must utilize and document eCalc 'Wells Score Deep Vein Thrombosis Risk' for DVT exclusion only. Emergency Department should utilize  Guidelines for Emergency Department Use of the VTE Exclusion D-Dimer and Clinical Pretest probability assessment model for DVT or PE exclusion.)   URINE CULTURE   URINE CULTURE   TROPONIN SERIES- (INITIAL, 1 HR)    Narrative:     The following orders were created for panel order Troponin Series, (0, 1 HR).  Procedure                               Abnormality         Status                     ---------                               -----------         ------                     Troponin I, High Sensiti...[[id number]]  Abnormal            Final result               Troponin, High Sensitivi...[[id number]]  Abnormal            Final result              "    Please view results for these tests on the individual orders.   URINALYSIS WITH REFLEX CULTURE AND MICROSCOPIC    Narrative:     The following orders were created for panel order Urinalysis with Reflex Culture and Microscopic.  Procedure                               Abnormality         Status                     ---------                               -----------         ------                     Urinalysis with Reflex C...[[id number]]  Abnormal            Final result               Extra Urine Gray Tube[[id number]]                            In process                   Please view results for these tests on the individual orders.   EXTRA URINE GRAY TUBE       EKG: Atrial fibrillation with slow ventricular sponsor 52 bpm, low voltage QRS complexes, no acute ST changes.  Loss of anterior R wave is appreciated.  Interpreted by MICA Richardson MD    RADIOLOGY:  Interpreted by Radiologist.  CT angio chest for pulmonary embolism   Final Result   No evidence of pulmonary embolism or aortic dissection.   1.5 cm nodule in the right lobe of the liver.  This is best evaluated   with ultrasound.   Cholelithiasis.   Diverticulosis without diverticulitis.   3.1 cm cyst on the left ovary.   Signed by Mike Rahman MD      CT abdomen pelvis w IV contrast   Final Result   No evidence of pulmonary embolism or aortic dissection.   1.5 cm nodule in the right lobe of the liver.  This is best evaluated   with ultrasound.   Cholelithiasis.   Diverticulosis without diverticulitis.   3.1 cm cyst on the left ovary.   Signed by Mike Rahman MD          ------------------------- NURSING NOTES AND VITALS REVIEWED ---------------------------   The nursing notes within the ED encounter and vital signs as below have been reviewed.   /64   Pulse 83   Temp 36.2 °C (97.2 °F) (Temporal)   Resp (!) 24   Ht 1.6 m (5' 3\")   Wt 88.5 kg (195 lb)   SpO2 96%   BMI 34.54 kg/m²   Oxygen Saturation Interpretation: " Normal      ---------------------------------------------------PHYSICAL EXAM--------------------------------------  Physical Exam   Constitutional/General: Alert and oriented x3, well appearing, non toxic in NAD  Head: Normocephalic and atraumatic  Eyes: PERRL, EOMI, conjunctiva normal, sclera non icteric  Mouth: Oropharynx clear, handling secretions, no trismus, no asymmetry of the posterior oropharynx or uvular edema  Neck: Supple, full ROM, non tender to palpation in the midline, no stridor, no crepitus, no meningeal signs, 1+ JVD  Respiratory: Lungs clear to auscultation bilaterally, no wheezes, rales, or rhonchi  Cardiovascular:  Regular rate. Irregular rhythm. No murmurs, gallops, or rubs. 2+ distal pulses  Chest: No chest wall tenderness  GI:  Abdomen Soft, Non tender, distended.  +BS. No organomegaly, no palpable masses,  No rebound, guarding, or rigidity. No CVAT positive fluid wave, no right upper quadrant tenderness  Musculoskeletal: Moves all extremities x 4. Warm and well perfused, no clubbing, cyanosis, or edema. Capillary refill <3 seconds  Integument: skin warm and dry. No rashes.   Lymphatic: no lymphadenopathy noted  Neurologic:  No focal deficits, symmetric strength 5/5 in the upper and lower extremities bilaterally  Psychiatric: Normal Affect, mildly confused and inconsistent with her answers    Procedures    Diagnoses as of 12/30/24 2043   Protuberant abdomen          Medical Decision Making:   Patient is abdominal distention.  Her laboratory testing shows no significant abnormalities.  Her BNP is less than 300.  Her CT scan shows no significant fluid collection or mass.  Her CBC showed no significant abnormalities.  She does have 6-10 white cells in her urinalysis but no evidence of acute infection.  She was discharged home for outpatient follow-up.      Counseling:   The emergency provider has spoken with the patient and family.  We   discussed today’s results, in addition to providing  specific details for the plan of care and counseling regarding the diagnosis and prognosis.  Questions are answered at this time and they are agreeable with the plan.      --------------------------------- IMPRESSION AND DISPOSITION ---------------------------------        IMPRESSION  1. Protuberant abdomen        DISPOSITION  Disposition: Discharge to home  Patient condition is fair      Billing Provider Critical Care Time: 0 minutes     Maldonado Richardson MD  12/30/24 5554

## 2024-12-30 NOTE — TELEPHONE ENCOUNTER
Patient's daughter called stating that stomach was distended and she has diarrhea. Daughter concerned she may have bowel obstruction and wanted order for xrays. Referred patient to er since no provider in office.

## 2024-12-30 NOTE — PROGRESS NOTES
Subjective   Patient ID: Nadeen Moore is a 84 y.o. female. They present today with a chief complaint of Other (Pt. C/O Abd extended, bloating, loose stool, increase in weight. Having SOB, no Abd pain/cramps. Pt. Is eating. /Started 2 weeks ago. ).    History of Present Illness  HPI    Past Medical History  Allergies as of 12/30/2024 - Reviewed 12/30/2024   Allergen Reaction Noted    Naproxen Anaphylaxis 05/17/2023    Nepafenac Anaphylaxis 12/04/2024    Bee venom protein (honey bee) Unknown 12/04/2024    Moexipril-hydrochlorothiazide Unknown 05/17/2023       (Not in a hospital admission)       Past Medical History:   Diagnosis Date    Personal history of other diseases of the musculoskeletal system and connective tissue     History of arthritis    Personal history of other diseases of the musculoskeletal system and connective tissue 08/12/2015    History of osteoporosis    Personal history of other diseases of the respiratory system     History of asthma    Personal history of other endocrine, nutritional and metabolic disease 08/03/2020    History of thyroid nodule    Personal history of other medical treatment 12/11/2019    History of screening mammography    Unspecified cataract     Cataract, bilateral       Past Surgical History:   Procedure Laterality Date    CATARACT EXTRACTION  11/13/2015    Cataract Surgery    COLONOSCOPY  03/18/2013    Complete Colonoscopy    KNEE ARTHROSCOPY W/ DEBRIDEMENT  07/25/2019    Knee Arthroscopy (Therapeutic)    OTHER SURGICAL HISTORY  05/06/2019    Varicose vein sclerotherapy    OTHER SURGICAL HISTORY  01/11/2014    Vaginal Pap smear    TOTAL KNEE ARTHROPLASTY  07/25/2019    Knee Replacement        reports that she has never smoked. She has never used smokeless tobacco. She reports that she does not drink alcohol and does not use drugs.    Review of Systems  Review of Systems                               Objective    Vitals:    12/30/24 1409   BP: 114/76   BP Location:  Left arm   Patient Position: Sitting   BP Cuff Size: Large adult   Pulse: 70   Resp: 18   Temp: 36.8 °C (98.3 °F)   TempSrc: Oral   SpO2: 95%   Weight: 88.8 kg (195 lb 12.3 oz)     No LMP recorded.    Physical Exam    Procedures    Point of Care Test & Imaging Results from this visit  No results found for this visit on 12/30/24.   No results found.    Diagnostic study results (if any) were reviewed by [facility].    Assessment/Plan   Allergies, medications, history, and pertinent labs/EKGs/Imaging reviewed by Dorie Garcia, APRN-CNP.     Medical Decision Making  84-year-old female reports with daughter for complaints of bloating and mild discomfort in abdomen.  She was seen in the emergency department on 1128 had test done for similar presentation.  Daughter says lately she has been feels like she has gained some weight she was put on Lasix but not having a lot of output.  Was unable to give a sample for us today.  Patient patient is in no acute distress although her stomach is distended.  Bowel sounds are positive there is no abdominal pain present.  Lungs are clear vital signs are stable.  Breathing normally we did an x-ray of abdomen.  X-ray of abdomen read by radiology.  Shows nonobstructive bowel gas pattern.  There is free intraperitoneal air and air-fluid levels cannot be excluded without upright or decubitus images.  Due to patient's symptoms and presentation and radiology interpretation patient is referred to the emergency department for further imaging possible CT scan possible labs.  Patient and daughter agree with plan of care.  Patient left in stable condition.  Patient denies chest pain dizziness.    Orders and Diagnoses  There are no diagnoses linked to this encounter.    Medical Admin Record      Patient disposition: ED    Electronically signed by [facility]  2:18 PM

## 2024-12-31 LAB — HOLD SPECIMEN: NORMAL

## 2024-12-31 NOTE — DISCHARGE INSTRUCTIONS
Continue regular medications.    Follow-up with your primary care doctor as needed.    Return for worsening symptoms or concerns.

## 2025-01-01 LAB — BACTERIA UR CULT: NORMAL

## 2025-01-02 ENCOUNTER — HOSPITAL ENCOUNTER (OUTPATIENT)
Dept: CARDIOLOGY | Facility: HOSPITAL | Age: 85
Discharge: HOME | End: 2025-01-02
Payer: MEDICARE

## 2025-01-02 LAB
Q ONSET: 225 MS
QRS COUNT: 8 BEATS
QRS DURATION: 82 MS
QT INTERVAL: 446 MS
QTC CALCULATION(BAZETT): 414 MS
QTC FREDERICIA: 425 MS
R AXIS: 18 DEGREES
T AXIS: 12 DEGREES
T OFFSET: 448 MS
VENTRICULAR RATE: 52 BPM

## 2025-01-02 PROCEDURE — 93005 ELECTROCARDIOGRAM TRACING: CPT

## 2025-01-20 DIAGNOSIS — I48.91 AF (ATRIAL FIBRILLATION) (MULTI): ICD-10-CM

## 2025-01-20 DIAGNOSIS — E03.9 HYPOTHYROIDISM, UNSPECIFIED TYPE: ICD-10-CM

## 2025-01-20 PROCEDURE — RXMED WILLOW AMBULATORY MEDICATION CHARGE

## 2025-01-20 RX ORDER — LEVOTHYROXINE SODIUM 75 UG/1
75 TABLET ORAL DAILY
Qty: 90 TABLET | Refills: 1 | Status: SHIPPED | OUTPATIENT
Start: 2025-01-20

## 2025-01-21 ENCOUNTER — PHARMACY VISIT (OUTPATIENT)
Dept: PHARMACY | Facility: CLINIC | Age: 85
End: 2025-01-21
Payer: COMMERCIAL

## 2025-01-21 DIAGNOSIS — E78.5 DYSLIPIDEMIA: ICD-10-CM

## 2025-01-21 RX ORDER — ATORVASTATIN CALCIUM 20 MG/1
20 TABLET, FILM COATED ORAL DAILY
Qty: 90 TABLET | Refills: 0 | Status: SHIPPED | OUTPATIENT
Start: 2025-01-21

## 2025-01-22 PROCEDURE — RXMED WILLOW AMBULATORY MEDICATION CHARGE

## 2025-01-30 ENCOUNTER — PHARMACY VISIT (OUTPATIENT)
Dept: PHARMACY | Facility: CLINIC | Age: 85
End: 2025-01-30
Payer: COMMERCIAL

## 2025-02-12 ENCOUNTER — PHARMACY VISIT (OUTPATIENT)
Dept: PHARMACY | Facility: CLINIC | Age: 85
End: 2025-02-12
Payer: COMMERCIAL

## 2025-02-12 DIAGNOSIS — G31.84 MCI (MILD COGNITIVE IMPAIRMENT): ICD-10-CM

## 2025-02-12 PROCEDURE — RXMED WILLOW AMBULATORY MEDICATION CHARGE

## 2025-02-12 RX ORDER — DONEPEZIL HYDROCHLORIDE 10 MG/1
10 TABLET, FILM COATED ORAL
Qty: 90 TABLET | Refills: 0 | Status: SHIPPED | OUTPATIENT
Start: 2025-02-12

## 2025-02-13 PROCEDURE — RXMED WILLOW AMBULATORY MEDICATION CHARGE

## 2025-02-14 ENCOUNTER — PHARMACY VISIT (OUTPATIENT)
Dept: PHARMACY | Facility: CLINIC | Age: 85
End: 2025-02-14
Payer: COMMERCIAL

## 2025-03-17 PROCEDURE — RXMED WILLOW AMBULATORY MEDICATION CHARGE

## 2025-03-20 ENCOUNTER — PHARMACY VISIT (OUTPATIENT)
Dept: PHARMACY | Facility: CLINIC | Age: 85
End: 2025-03-20
Payer: COMMERCIAL

## 2025-03-24 DIAGNOSIS — I50.9 CONGESTIVE HEART FAILURE, UNSPECIFIED HF CHRONICITY, UNSPECIFIED HEART FAILURE TYPE: ICD-10-CM

## 2025-03-24 RX ORDER — FUROSEMIDE 40 MG/1
60 TABLET ORAL DAILY
Qty: 135 TABLET | Refills: 1 | Status: SHIPPED | OUTPATIENT
Start: 2025-03-24

## 2025-04-02 ENCOUNTER — PHARMACY VISIT (OUTPATIENT)
Dept: PHARMACY | Facility: CLINIC | Age: 85
End: 2025-04-02
Payer: COMMERCIAL

## 2025-04-02 PROCEDURE — RXMED WILLOW AMBULATORY MEDICATION CHARGE

## 2025-04-14 PROCEDURE — RXMED WILLOW AMBULATORY MEDICATION CHARGE

## 2025-04-17 ENCOUNTER — PHARMACY VISIT (OUTPATIENT)
Dept: PHARMACY | Facility: CLINIC | Age: 85
End: 2025-04-17
Payer: COMMERCIAL

## 2025-04-21 PROCEDURE — RXMED WILLOW AMBULATORY MEDICATION CHARGE

## 2025-04-22 ENCOUNTER — PHARMACY VISIT (OUTPATIENT)
Dept: PHARMACY | Facility: CLINIC | Age: 85
End: 2025-04-22
Payer: COMMERCIAL

## 2025-04-23 ENCOUNTER — PHARMACY VISIT (OUTPATIENT)
Dept: PHARMACY | Facility: CLINIC | Age: 85
End: 2025-04-23
Payer: COMMERCIAL

## 2025-04-23 DIAGNOSIS — E78.5 DYSLIPIDEMIA: ICD-10-CM

## 2025-04-23 PROCEDURE — RXMED WILLOW AMBULATORY MEDICATION CHARGE

## 2025-04-23 RX ORDER — ATORVASTATIN CALCIUM 20 MG/1
20 TABLET, FILM COATED ORAL DAILY
Qty: 90 TABLET | Refills: 0 | Status: SHIPPED | OUTPATIENT
Start: 2025-04-23

## 2025-04-25 ENCOUNTER — PHARMACY VISIT (OUTPATIENT)
Dept: PHARMACY | Facility: CLINIC | Age: 85
End: 2025-04-25

## 2025-04-25 PROCEDURE — RXMED WILLOW AMBULATORY MEDICATION CHARGE

## 2025-04-25 RX ORDER — SODIUM FLUORIDE 6 MG/ML
PASTE, DENTIFRICE DENTAL
Qty: 100 ML | Refills: 3 | OUTPATIENT
Start: 2025-04-23

## 2025-05-12 DIAGNOSIS — I48.91 AF (ATRIAL FIBRILLATION) (MULTI): ICD-10-CM

## 2025-05-12 PROCEDURE — RXMED WILLOW AMBULATORY MEDICATION CHARGE

## 2025-05-13 DIAGNOSIS — I10 HYPERTENSION, UNSPECIFIED TYPE: ICD-10-CM

## 2025-05-13 DIAGNOSIS — G31.84 MCI (MILD COGNITIVE IMPAIRMENT): ICD-10-CM

## 2025-05-13 PROCEDURE — RXMED WILLOW AMBULATORY MEDICATION CHARGE

## 2025-05-13 RX ORDER — DILTIAZEM HYDROCHLORIDE 180 MG/1
180 CAPSULE, COATED, EXTENDED RELEASE ORAL DAILY
Qty: 90 CAPSULE | Refills: 1 | Status: SHIPPED | OUTPATIENT
Start: 2025-05-13

## 2025-05-13 RX ORDER — DONEPEZIL HYDROCHLORIDE 10 MG/1
10 TABLET, FILM COATED ORAL
Qty: 90 TABLET | Refills: 1 | Status: SHIPPED | OUTPATIENT
Start: 2025-05-13

## 2025-05-15 ENCOUNTER — PHARMACY VISIT (OUTPATIENT)
Dept: PHARMACY | Facility: CLINIC | Age: 85
End: 2025-05-15
Payer: COMMERCIAL

## 2025-06-04 ENCOUNTER — APPOINTMENT (OUTPATIENT)
Dept: PRIMARY CARE | Facility: CLINIC | Age: 85
End: 2025-06-04
Payer: MEDICARE

## 2025-06-04 VITALS
WEIGHT: 196.6 LBS | SYSTOLIC BLOOD PRESSURE: 118 MMHG | HEIGHT: 63 IN | HEART RATE: 74 BPM | OXYGEN SATURATION: 95 % | TEMPERATURE: 96.8 F | BODY MASS INDEX: 34.84 KG/M2 | DIASTOLIC BLOOD PRESSURE: 72 MMHG

## 2025-06-04 DIAGNOSIS — I10 HYPERTENSION, UNSPECIFIED TYPE: ICD-10-CM

## 2025-06-04 DIAGNOSIS — R73.09 ABNORMAL BLOOD SUGAR: ICD-10-CM

## 2025-06-04 DIAGNOSIS — Z79.899 HIGH RISK MEDICATION USE: ICD-10-CM

## 2025-06-04 DIAGNOSIS — E53.8 VITAMIN B12 DEFICIENCY: ICD-10-CM

## 2025-06-04 DIAGNOSIS — F02.A0 MILD LATE ONSET ALZHEIMER'S DEMENTIA, UNSPECIFIED WHETHER BEHAVIORAL, PSYCHOTIC, OR MOOD DISTURBANCE OR ANXIETY: ICD-10-CM

## 2025-06-04 DIAGNOSIS — Z78.0 MENOPAUSE: ICD-10-CM

## 2025-06-04 DIAGNOSIS — I48.21 PERMANENT ATRIAL FIBRILLATION (MULTI): ICD-10-CM

## 2025-06-04 DIAGNOSIS — Z12.31 ENCOUNTER FOR SCREENING MAMMOGRAM FOR MALIGNANT NEOPLASM OF BREAST: ICD-10-CM

## 2025-06-04 DIAGNOSIS — N18.32 STAGE 3B CHRONIC KIDNEY DISEASE (MULTI): ICD-10-CM

## 2025-06-04 DIAGNOSIS — Z00.00 ROUTINE GENERAL MEDICAL EXAMINATION AT HEALTH CARE FACILITY: Primary | ICD-10-CM

## 2025-06-04 DIAGNOSIS — E03.9 HYPOTHYROIDISM, UNSPECIFIED TYPE: ICD-10-CM

## 2025-06-04 DIAGNOSIS — E78.00 HYPERCHOLESTEREMIA: ICD-10-CM

## 2025-06-04 DIAGNOSIS — I50.32 CHRONIC DIASTOLIC CONGESTIVE HEART FAILURE: ICD-10-CM

## 2025-06-04 DIAGNOSIS — G30.1 MILD LATE ONSET ALZHEIMER'S DEMENTIA, UNSPECIFIED WHETHER BEHAVIORAL, PSYCHOTIC, OR MOOD DISTURBANCE OR ANXIETY: ICD-10-CM

## 2025-06-04 DIAGNOSIS — R53.83 OTHER FATIGUE: ICD-10-CM

## 2025-06-04 DIAGNOSIS — E55.9 VITAMIN D DEFICIENCY, UNSPECIFIED: ICD-10-CM

## 2025-06-04 PROBLEM — I50.9 CHF (CONGESTIVE HEART FAILURE): Status: RESOLVED | Noted: 2023-05-17 | Resolved: 2025-06-04

## 2025-06-04 RX ORDER — LEVOTHYROXINE SODIUM 75 UG/1
75 TABLET ORAL DAILY
Qty: 90 TABLET | Refills: 1 | Status: SHIPPED | OUTPATIENT
Start: 2025-06-04

## 2025-06-04 ASSESSMENT — ENCOUNTER SYMPTOMS
SORE THROAT: 0
WHEEZING: 0
UNEXPECTED WEIGHT CHANGE: 0
FEVER: 0
PALPITATIONS: 0
DIFFICULTY URINATING: 0
FATIGUE: 0
BLOOD IN STOOL: 0
ARTHRALGIAS: 0
DIARRHEA: 0
DIZZINESS: 0
SINUS PAIN: 0
COUGH: 0
ABDOMINAL PAIN: 0
HEADACHES: 0
BRUISES/BLEEDS EASILY: 0

## 2025-06-04 ASSESSMENT — ACTIVITIES OF DAILY LIVING (ADL)
MANAGING_FINANCES: NEEDS ASSISTANCE
GROCERY_SHOPPING: TOTAL CARE
DOING_HOUSEWORK: TOTAL CARE
BATHING: INDEPENDENT
TAKING_MEDICATION: NEEDS ASSISTANCE
DRESSING: INDEPENDENT

## 2025-06-04 ASSESSMENT — PATIENT HEALTH QUESTIONNAIRE - PHQ9
1. LITTLE INTEREST OR PLEASURE IN DOING THINGS: NOT AT ALL
SUM OF ALL RESPONSES TO PHQ9 QUESTIONS 1 AND 2: 0
2. FEELING DOWN, DEPRESSED OR HOPELESS: NOT AT ALL

## 2025-06-04 NOTE — ASSESSMENT & PLAN NOTE
Orders:    Albumin-Creatinine Ratio, Urine Random; Future    Comprehensive Metabolic Panel; Future

## 2025-06-04 NOTE — PROGRESS NOTES
Subjective   Reason for Visit: Nadeen Moore is an 85 y.o. female here for a Medicare Wellness visit.     Past Medical, Surgical, and Family History reviewed and updated in chart.    Reviewed all medications by prescribing practitioner or clinical pharmacist (such as prescriptions, OTCs, herbal therapies and supplements) and documented in the medical record.    - Annual Medicare physical  - Vaccinations reviewed and up-to-date  - Needs screening mammogram  - Needs bone density  - Screen for depression negative  - Advance of directive reviewed    Follow-up  - CHF compensated on Lasix 60 mg daily  - Chronic kidney disease stable follow-up albumin creatinine ratio hemoglobin A1c  - Multinodular goiter ultrasound showed no change normal thyroid function test  Follow-up Dr. Hanna as needed  - Left hip arthroplasty doing well counts about falling risk of prevention.  - Patient seen in the [facility] for memory evaluation patient preliminary testing mild dementia counseled about conservative measures for dementia  Continue on Aricept 10 mg no significant change awaiting follow-up.  -Chronic atrial fibrillation rate controlled patient has switched to Eliquis doing much better compensated.  -Underlying history of DVT and PE atrial fibrillation continue Eliquis indefinitely.  -pulmonary embolism compensated no recurrence  - status post right shoulder arthroplasty doing very well  -Proceed with lab results follow-up 6 months             Patient Care Team:  Tiny Norris MD as PCP - General  Tiny Norris MD as PCP - [facility] Attributed Provider     Review of Systems   Constitutional:  Negative for fatigue, fever and unexpected weight change.   HENT:  Negative for congestion, ear discharge, ear pain, mouth sores, sinus pain and sore throat.    Eyes:  Negative for visual disturbance.   Respiratory:  Negative for cough and wheezing.    Cardiovascular:  Negative for chest pain, palpitations  "and leg swelling.   Gastrointestinal:  Negative for abdominal pain, blood in stool and diarrhea.   Genitourinary:  Negative for difficulty urinating.   Musculoskeletal:  Negative for arthralgias.   Skin:  Negative for rash.   Neurological:  Negative for dizziness and headaches.   Hematological:  Does not bruise/bleed easily.   Psychiatric/Behavioral:  Negative for behavioral problems.    All other systems reviewed and are negative.      Objective   Vitals:  /72   Pulse 74   Temp 36 °C (96.8 °F)   Ht 1.588 m (5' 2.5\")   Wt 89.2 kg (196 lb 9.6 oz)   SpO2 95%   BMI 35.39 kg/m²     Lab Results   Component Value Date    WBC 7.3 12/30/2024    HGB 13.7 12/30/2024    HCT 42.3 12/30/2024     12/30/2024    CHOL 130 05/25/2023    TRIG 76 05/25/2023    HDL 45.3 05/25/2023    ALT 14 12/30/2024    AST 17 12/30/2024     12/30/2024    K 4.2 12/30/2024     12/30/2024    CREATININE 1.22 (H) 12/30/2024    BUN 20 12/30/2024    CO2 31 12/30/2024    TSH 1.41 05/25/2023    INR 2.10 (N) 12/11/2023     par   Physical Exam  Vitals and nursing note reviewed.   Constitutional:       Appearance: Normal appearance.   HENT:      Head: Normocephalic.      Nose: Nose normal.   Eyes:      Conjunctiva/sclera: Conjunctivae normal.      Pupils: Pupils are equal, round, and reactive to light.   Cardiovascular:      Rate and Rhythm: Regular rhythm.   Pulmonary:      Effort: Pulmonary effort is normal.      Breath sounds: Normal breath sounds.   Abdominal:      General: Abdomen is flat.      Palpations: Abdomen is soft.   Musculoskeletal:      Cervical back: Neck supple.   Skin:     General: Skin is warm.   Neurological:      General: No focal deficit present.      Mental Status: She is oriented to person, place, and time.   Psychiatric:         Mood and Affect: Mood normal.         Assessment & Plan  Hypothyroidism, unspecified type    Orders:    levothyroxine (Synthroid, Levoxyl) 75 mcg tablet; Take 1 tablet (75 mcg) by " mouth once daily.    Routine general medical examination at health care facility    Orders:    1 Year Follow Up In Primary Care - Wellness Exam; Future    High risk medication use    Orders:    CBC and Auto Differential; Future    Encounter for screening mammogram for malignant neoplasm of breast    Orders:    BI mammo bilateral screening tomosynthesis; Future    Abnormal blood sugar    Orders:    Hemoglobin A1C; Future    Hypertension, unspecified type    Orders:    Albumin-Creatinine Ratio, Urine Random; Future    Comprehensive Metabolic Panel; Future    Hypercholesteremia    Orders:    Lipid Panel; Future    Other fatigue    Orders:    TSH with reflex to Free T4 if abnormal; Future    Vitamin B12 deficiency    Orders:    Vitamin B12; Future    Vitamin D deficiency, unspecified    Orders:    Vitamin D 25-Hydroxy,Total (for eval of Vitamin D levels); Future    Menopause    Orders:    XR DEXA bone density; Future    Stage 3b chronic kidney disease (Multi)     Annual Medicare physical  - Vaccinations reviewed and up-to-date  - Needs screening mammogram  - Needs bone density  - Screen for depression negative  - Advance of directive reviewed    Follow-up  - CHF compensated on Lasix 60 mg daily  - Chronic kidney disease stable follow-up albumin creatinine ratio hemoglobin A1c  - Multinodular goiter ultrasound showed no change normal thyroid function test  Follow-up Dr. Hanna as needed  - Left hip arthroplasty doing well counts about falling risk of prevention.  - Patient seen in the [facility] for memory evaluation patient preliminary testing mild dementia counseled about conservative measures for dementia  Continue on Aricept 10 mg no significant change awaiting follow-up.  -Chronic atrial fibrillation rate controlled patient has switched to Eliquis doing much better compensated.  -Underlying history of DVT and PE atrial fibrillation continue Eliquis indefinitely.  -pulmonary embolism  compensated no recurrence  - status post right shoulder arthroplasty doing very well  -Proceed with lab results follow-up 6 months

## 2025-06-04 NOTE — PROGRESS NOTES
"Subjective   Patient ID: Nadeen Moore is a 85 y.o. female who presents for Medicare Annual Wellness Visit Subsequent.    HPI       Review of Systems    Objective   No results found for: \"HGBA1C\"   /72   Pulse 74   Temp 36 °C (96.8 °F)   Ht 1.588 m (5' 2.5\")   Wt 89.2 kg (196 lb 9.6 oz)   SpO2 95%   BMI 35.39 kg/m²     Physical Exam    Assessment/Plan   Nadeen was seen today for medicare annual wellness visit subsequent.  Diagnoses and all orders for this visit:  Hypothyroidism, unspecified type  Other orders  -     Follow Up In Primary Care - Established     "

## 2025-06-04 NOTE — ASSESSMENT & PLAN NOTE
Orders:    levothyroxine (Synthroid, Levoxyl) 75 mcg tablet; Take 1 tablet (75 mcg) by mouth once daily.

## 2025-06-11 PROCEDURE — RXMED WILLOW AMBULATORY MEDICATION CHARGE

## 2025-06-12 ENCOUNTER — PHARMACY VISIT (OUTPATIENT)
Dept: PHARMACY | Facility: CLINIC | Age: 85
End: 2025-06-12
Payer: COMMERCIAL

## 2025-06-24 PROCEDURE — RXMED WILLOW AMBULATORY MEDICATION CHARGE

## 2025-06-27 ENCOUNTER — PHARMACY VISIT (OUTPATIENT)
Dept: PHARMACY | Facility: CLINIC | Age: 85
End: 2025-06-27
Payer: COMMERCIAL

## 2025-07-09 PROCEDURE — RXMED WILLOW AMBULATORY MEDICATION CHARGE

## 2025-07-14 ENCOUNTER — PHARMACY VISIT (OUTPATIENT)
Dept: PHARMACY | Facility: CLINIC | Age: 85
End: 2025-07-14
Payer: COMMERCIAL

## 2025-07-18 ENCOUNTER — PHARMACY VISIT (OUTPATIENT)
Dept: PHARMACY | Facility: CLINIC | Age: 85
End: 2025-07-18
Payer: COMMERCIAL

## 2025-07-18 PROCEDURE — RXMED WILLOW AMBULATORY MEDICATION CHARGE

## 2025-07-22 DIAGNOSIS — E78.5 DYSLIPIDEMIA: ICD-10-CM

## 2025-07-22 PROCEDURE — RXMED WILLOW AMBULATORY MEDICATION CHARGE

## 2025-07-22 RX ORDER — ATORVASTATIN CALCIUM 20 MG/1
20 TABLET, FILM COATED ORAL DAILY
Qty: 90 TABLET | Refills: 0 | Status: SHIPPED | OUTPATIENT
Start: 2025-07-22

## 2025-07-24 ENCOUNTER — PHARMACY VISIT (OUTPATIENT)
Dept: PHARMACY | Facility: CLINIC | Age: 85
End: 2025-07-24
Payer: COMMERCIAL

## 2025-08-06 PROCEDURE — RXMED WILLOW AMBULATORY MEDICATION CHARGE

## 2025-08-11 ENCOUNTER — PHARMACY VISIT (OUTPATIENT)
Dept: PHARMACY | Facility: CLINIC | Age: 85
End: 2025-08-11
Payer: COMMERCIAL

## 2025-08-11 PROCEDURE — RXMED WILLOW AMBULATORY MEDICATION CHARGE

## 2025-11-12 ENCOUNTER — APPOINTMENT (OUTPATIENT)
Dept: PRIMARY CARE | Facility: CLINIC | Age: 85
End: 2025-11-12
Payer: MEDICARE